# Patient Record
Sex: FEMALE | Race: WHITE | NOT HISPANIC OR LATINO | URBAN - METROPOLITAN AREA
[De-identification: names, ages, dates, MRNs, and addresses within clinical notes are randomized per-mention and may not be internally consistent; named-entity substitution may affect disease eponyms.]

---

## 2021-11-17 ENCOUNTER — EMERGENCY (EMERGENCY)
Facility: HOSPITAL | Age: 36
LOS: 1 days | Discharge: HOME | End: 2021-11-17
Attending: EMERGENCY MEDICINE | Admitting: EMERGENCY MEDICINE
Payer: COMMERCIAL

## 2021-11-17 VITALS
DIASTOLIC BLOOD PRESSURE: 91 MMHG | RESPIRATION RATE: 18 BRPM | OXYGEN SATURATION: 98 % | HEART RATE: 97 BPM | SYSTOLIC BLOOD PRESSURE: 135 MMHG | TEMPERATURE: 99 F

## 2021-11-17 PROCEDURE — 99284 EMERGENCY DEPT VISIT MOD MDM: CPT

## 2021-11-17 NOTE — ED ADULT TRIAGE NOTE - CHIEF COMPLAINT QUOTE
Pt states she is 8 weeks pregnant, tested +Covid. Pt states she is being sent in by her OBGYN to get the monoclonal antibodies. c/o fever/cough and stuffy nose.

## 2021-11-18 VITALS
DIASTOLIC BLOOD PRESSURE: 84 MMHG | RESPIRATION RATE: 16 BRPM | SYSTOLIC BLOOD PRESSURE: 117 MMHG | TEMPERATURE: 98 F | HEART RATE: 83 BPM | OXYGEN SATURATION: 99 %

## 2021-11-18 LAB
APPEARANCE UR: ABNORMAL
BACTERIA # UR AUTO: ABNORMAL
BILIRUB UR-MCNC: NEGATIVE — SIGNIFICANT CHANGE UP
COLOR SPEC: YELLOW — SIGNIFICANT CHANGE UP
DIFF PNL FLD: ABNORMAL
EPI CELLS # UR: 19 /HPF — HIGH (ref 0–5)
GLUCOSE UR QL: NEGATIVE — SIGNIFICANT CHANGE UP
HYALINE CASTS # UR AUTO: 0 /LPF — SIGNIFICANT CHANGE UP (ref 0–7)
KETONES UR-MCNC: NEGATIVE — SIGNIFICANT CHANGE UP
LEUKOCYTE ESTERASE UR-ACNC: ABNORMAL
NITRITE UR-MCNC: NEGATIVE — SIGNIFICANT CHANGE UP
PH UR: 5.5 — SIGNIFICANT CHANGE UP (ref 5–8)
PROT UR-MCNC: ABNORMAL
RBC CASTS # UR COMP ASSIST: 2 /HPF — SIGNIFICANT CHANGE UP (ref 0–4)
SP GR SPEC: 1.03 — SIGNIFICANT CHANGE UP (ref 1–1.05)
UROBILINOGEN FLD QL: SIGNIFICANT CHANGE UP
WBC UR QL: 3 /HPF — SIGNIFICANT CHANGE UP (ref 0–5)

## 2021-11-18 NOTE — ED ADULT NURSE NOTE - OBJECTIVE STATEMENT
35 year old female presents A&Ox4 8 week pregnant, coming into ED after testing positive for COVID-19 today. Pt states OBGYN sent her in for monoclonal antibodies. Pt endorsing current yeast infection, vaginal discomfort and stuffy nose at this time but denies any other symptoms. States she had a fever earlier but fever broke after taking tylenol at home. Respirations even and unlabored, speaking in full sentences without any difficulty, no accessory muscle use, sating 99% on room air, skin intact. Pt denies any chest pain, dyspnea, dizziness, blurry vision, nausea, vomiting, diarrhea, recent falls or LOC. Pt well appearing and in no acute distress at this time. Pt awaiting urine results. Bed in lowest position, safety maintained, call bell within reach.

## 2021-11-18 NOTE — ED PROVIDER NOTE - PROGRESS NOTE DETAILS
Eder Flores, PGY3: UA contaminated (19 epithelials). Will send cx but will likely result as >3 organisms. Patient currently without dysuria. Will advise to follow up with PMD or OB for repeat. Will give CARES for infusion. Discussed return precautions and all questions answered. Pt in agreement w/ plan. CAOx3, NAD, VSS. Stable for d/c.

## 2021-11-18 NOTE — ED PROVIDER NOTE - NSFOLLOWUPINSTRUCTIONS_ED_ALL_ED_FT
Please call our [facility] Ambulatory Resource Support (CARES) program at [phone].    Rest, drink plenty of fluids.  Advance activity as tolerated.  Continue all previously prescribed medications as directed.  Follow up with your primary care physician in 48-72 hours- bring copies of your results.  Return to the ER for worsening or persistent symptoms, and/or ANY NEW OR CONCERNING SYMPTOMS. If you have issues obtaining follow up, please call: [phone] to obtain a doctor or specialist who takes your insurance in your area.

## 2021-11-18 NOTE — ED PROVIDER NOTE - OBJECTIVE STATEMENT
35 year old female no PMH  currently 8 weeks pregnant presents to ED for monoclonal antibodies. Patient states fever, chills, and URI sx since . She tested COVID+ today. She was referred to the ED by her OBGYN for monoclonal antibody infusion. She denies dysuria but states she is currently on monostat for yeast infection. She denies sob, cp, abd pain, vaginal discharge, n/v/d.

## 2021-11-18 NOTE — ED PROVIDER NOTE - NS ED ROS FT
GENERAL: +fever, +chills  EYES: no change in vision, no irritation, no discharge, no redness, no pain  HEENT: no trouble swallowing or speaking, no throat pain, no ear pain, +congestion  CARDIAC: no chest pain, no palpitations   PULMONARY: +cough, no shortness of breath, no wheezing  GI: no abdominal pain, no nausea, no vomiting, no diarrhea, no constipation  : no changes in urination, no dysuria  SKIN: no rashes  NEURO: no headache, no numbness, no weakness  MSK: no joint pain, no muscle pain, no back pain, no calf pain

## 2021-11-18 NOTE — ED PROVIDER NOTE - PATIENT PORTAL LINK FT
You can access the FollowMyHealth Patient Portal offered by [facility] by registering at the following website: [email or web address]. By joining [facility]’s FollowMyHealth portal, you will also be able to view your health information using other applications (apps) compatible with our system.

## 2021-11-18 NOTE — ED PROVIDER NOTE - ATTENDING CONTRIBUTION TO CARE
Seen and examined, states early preg., had N/V 2wks ago, improved now, sophia po well, dx covid and c/o fever and HA, mild cough, no SOB/[place]FF[place], pulse ox 100% RA, told by OB to go to ED for monoclonal Ab tx but unable to provide in ED tonight. Denies other c/o, no CP/back pain, no vag bleeding or discharge, has close OB F/U. MMM, clear lungs, heart reg, abd soft, NT to palp.

## 2021-11-18 NOTE — ED ADULT NURSE NOTE - HAVE YOU HAD A FIRST COVID-19 BOOSTER?
Patient accepted and admission order received from:: CLAUDIA CARTER [[id number]]   Patient Class: Inpatient [1]   Patient on Telemetry: Yes   Transferring Patient to? Only adjust for transfers between [facility] ([facility], [facility], [facility]: [facility] [[id number]]  
No

## 2021-11-18 NOTE — ED PROVIDER NOTE - NSPTACCESSSVCSAPPTDETAILS_ED_ALL_ED_FT
URGENT: Please register for CARES. Patient is 8 weeks pregnant and COVID+ and requesting monoclonal antibody infusion.

## 2021-11-18 NOTE — ED PROVIDER NOTE - CLINICAL SUMMARY MEDICAL DECISION MAKING FREE TEXT BOX
Eder Flores, PGY3: 35 year old female COVID+ presents for monoclonal ab infusion. Currently 8 weeks pregnant. Will obtain UA r/o bacteruria, unable to do infusion due to nursing restrictions. Will give CARES follow up.

## 2021-11-19 LAB
CULTURE RESULTS: SIGNIFICANT CHANGE UP
SPECIMEN SOURCE: SIGNIFICANT CHANGE UP

## 2021-11-21 NOTE — ED POST DISCHARGE NOTE - RESULT SUMMARY
UCX: Streptococcus agalactiae Gp B 50,000-99,000CFU/ml . No antibiotic listed in ED provider note or prescription writer at time of discharge. Patient pregnant. message left with Call Back  P.A. number and hours for return call back.

## 2022-01-28 ENCOUNTER — TRANSCRIPTION ENCOUNTER (OUTPATIENT)
Age: 37
End: 2022-01-28

## 2022-01-28 ENCOUNTER — OUTPATIENT (OUTPATIENT)
Dept: INPATIENT UNIT | Facility: HOSPITAL | Age: 37
LOS: 1 days | Discharge: HOME | End: 2022-01-28

## 2022-01-28 VITALS
HEART RATE: 89 BPM | TEMPERATURE: 98 F | SYSTOLIC BLOOD PRESSURE: 120 MMHG | DIASTOLIC BLOOD PRESSURE: 63 MMHG | RESPIRATION RATE: 16 BRPM

## 2022-01-28 VITALS — SYSTOLIC BLOOD PRESSURE: 109 MMHG | DIASTOLIC BLOOD PRESSURE: 66 MMHG | HEART RATE: 74 BPM

## 2022-01-28 DIAGNOSIS — Z3A.00 WEEKS OF GESTATION OF PREGNANCY NOT SPECIFIED: ICD-10-CM

## 2022-01-28 DIAGNOSIS — O26.899 OTHER SPECIFIED PREGNANCY RELATED CONDITIONS, UNSPECIFIED TRIMESTER: ICD-10-CM

## 2022-01-28 PROBLEM — Z00.00 ENCOUNTER FOR PREVENTIVE HEALTH EXAMINATION: Status: ACTIVE | Noted: 2022-01-28

## 2022-01-28 LAB
APPEARANCE UR: CLEAR — SIGNIFICANT CHANGE UP
BACTERIA # UR AUTO: ABNORMAL
BILIRUB UR-MCNC: NEGATIVE — SIGNIFICANT CHANGE UP
COLOR SPEC: YELLOW — SIGNIFICANT CHANGE UP
DIFF PNL FLD: ABNORMAL
EPI CELLS # UR: 3 /HPF — SIGNIFICANT CHANGE UP (ref 0–5)
GLUCOSE UR QL: NEGATIVE — SIGNIFICANT CHANGE UP
HYALINE CASTS # UR AUTO: 0 /LPF — SIGNIFICANT CHANGE UP (ref 0–7)
KETONES UR-MCNC: NEGATIVE — SIGNIFICANT CHANGE UP
LEUKOCYTE ESTERASE UR-ACNC: ABNORMAL
NITRITE UR-MCNC: NEGATIVE — SIGNIFICANT CHANGE UP
PH UR: 6 — SIGNIFICANT CHANGE UP (ref 5–8)
PROT UR-MCNC: ABNORMAL
RBC CASTS # UR COMP ASSIST: 2 /HPF — SIGNIFICANT CHANGE UP (ref 0–4)
SP GR SPEC: 1.02 — SIGNIFICANT CHANGE UP (ref 1–1.05)
UROBILINOGEN FLD QL: SIGNIFICANT CHANGE UP
WBC UR QL: 6 /HPF — HIGH (ref 0–5)

## 2022-01-28 PROCEDURE — 99213 OFFICE O/P EST LOW 20 MIN: CPT

## 2022-01-28 NOTE — OB PROVIDER TRIAGE NOTE - NSHPPHYSICALEXAM_GEN_ALL_CORE
Vital Signs Last 24 Hrs  T(C): 36.9 (28 Jan 2022 12:44), Max: 36.9 (28 Jan 2022 11:51)  T(F): 98.42 (28 Jan 2022 12:44), Max: 98.42 (28 Jan 2022 12:44)  HR: 71 (28 Jan 2022 14:57) (71 - 166)  BP: 107/62 (28 Jan 2022 14:57) (107/62 - 126/67)  RR: 16 (28 Jan 2022 11:51) (16 - 16)    Abdomen gravid, soft and nontender  Neg cva/suprapubic tenderness  Casey monitoring  TAS - + FH 148bpm          - breech/post plac/mvp 4.66  SSE - dark brown discharge.  No active process noted.  Vaginal mucosa appears irritated/friable.  Cervix appears closed on inspection.  TVUS - Cervical length 4.51 -4.83cm with no dynamic changes.  cervix appears closed on inspection.    SVE - deferred  Orders:  UA  Patient is scheduled  for an anatomy sono on 2/7/2022.

## 2022-01-28 NOTE — OB RN TRIAGE NOTE - FALL HARM RISK - UNIVERSAL INTERVENTIONS
Bed in lowest position, wheels locked, appropriate side rails in place/Call bell, personal items and telephone in reach/Instruct patient to call for assistance before getting out of bed or chair/Non-slip footwear when patient is out of bed/[place] to call system/Physically safe environment - no spills, clutter or unnecessary equipment/Purposeful Proactive Rounding/Room/bathroom lighting operational, light cord in reach

## 2022-01-28 NOTE — OB PROVIDER TRIAGE NOTE - HISTORY OF PRESENT ILLNESS
PNC Provider:  Dr Campuzano  EDC:  6/26/2022  Patient is a 37y/o G 1 P 0000 @ 18 6/7wks gest with c/o spotting; brownic discharge after 0900.  Denies  fever, chills, loss of fluids, contractions, dysuria.     Reports that she is currently having a yeast infections.    Denies prior covid-19 infection;    vaccinated - pfizer  AP Course: recurrent yeast infections.  States that she was prescribed Terazone last wk. but has not taken it.  Meds: pnv, probiotic, folic acid, vit c & asa 81mg daily  NKDA    PMHx/PSHx/GYN/SH/Psych denies  OBHx - present

## 2022-01-28 NOTE — OB PROVIDER TRIAGE NOTE - NSOBPROVIDERNOTE_OBGYN_ALL_OB_FT
Discussed findings with  Discussed findings with Dr. Bergman  Plan:  patient is cleared for discharge home and instructed to use terconazole as prescribed.  Encouraged to eat unsweetened yogurt.  F/U with pnc provider as scheduled

## 2022-01-28 NOTE — OB PROVIDER TRIAGE NOTE - NSHPLABSRESULTS_GEN_ALL_CORE
UA UA                  Urinalysis Basic - ( 2022 15:23 )    Color: Yellow / Appearance: Clear / S.022 / pH: x  Gluc: x / Ketone: Negative  / Bili: Negative / Urobili: <2 mg/dL   Blood: x / Protein: Trace / Nitrite: Negative   Leuk Esterase: Large / RBC: 2 /HPF / WBC 6 /HPF   Sq Epi: x / Non Sq Epi: 3 /HPF / Bacteria: Few

## 2022-01-28 NOTE — OB PROVIDER TRIAGE NOTE - ADDITIONAL INSTRUCTIONS
Plan:  patient is cleared for discharge home and instructed to use terconazole as prescribed.  Encouraged to eat unsweetened yogurt.  F/U with pnc provider as scheduled

## 2022-02-07 ENCOUNTER — ASOB RESULT (OUTPATIENT)
Age: 37
End: 2022-02-07

## 2022-02-07 ENCOUNTER — APPOINTMENT (OUTPATIENT)
Dept: ANTEPARTUM | Facility: CLINIC | Age: 37
End: 2022-02-07
Payer: COMMERCIAL

## 2022-02-07 PROCEDURE — 76805 OB US >/= 14 WKS SNGL FETUS: CPT

## 2022-04-06 ENCOUNTER — APPOINTMENT (OUTPATIENT)
Dept: ANTEPARTUM | Facility: CLINIC | Age: 37
End: 2022-04-06

## 2022-04-12 ENCOUNTER — ASOB RESULT (OUTPATIENT)
Age: 37
End: 2022-04-12

## 2022-04-12 ENCOUNTER — APPOINTMENT (OUTPATIENT)
Dept: ANTEPARTUM | Facility: CLINIC | Age: 37
End: 2022-04-12
Payer: COMMERCIAL

## 2022-04-12 PROCEDURE — 76819 FETAL BIOPHYS PROFIL W/O NST: CPT

## 2022-04-12 PROCEDURE — 76816 OB US FOLLOW-UP PER FETUS: CPT

## 2022-05-27 ENCOUNTER — APPOINTMENT (OUTPATIENT)
Dept: ANTEPARTUM | Facility: CLINIC | Age: 37
End: 2022-05-27
Payer: COMMERCIAL

## 2022-05-27 ENCOUNTER — ASOB RESULT (OUTPATIENT)
Age: 37
End: 2022-05-27

## 2022-05-27 PROCEDURE — 76816 OB US FOLLOW-UP PER FETUS: CPT

## 2022-05-27 PROCEDURE — 76819 FETAL BIOPHYS PROFIL W/O NST: CPT

## 2022-06-19 ENCOUNTER — INPATIENT (INPATIENT)
Facility: HOSPITAL | Age: 37
LOS: 3 days | Discharge: HOME | End: 2022-06-23
Attending: OBSTETRICS & GYNECOLOGY | Admitting: OBSTETRICS & GYNECOLOGY

## 2022-06-19 ENCOUNTER — TRANSCRIPTION ENCOUNTER (OUTPATIENT)
Age: 37
End: 2022-06-19

## 2022-06-19 VITALS — TEMPERATURE: 98 F | DIASTOLIC BLOOD PRESSURE: 69 MMHG | SYSTOLIC BLOOD PRESSURE: 115 MMHG | HEART RATE: 79 BPM

## 2022-06-19 DIAGNOSIS — Z3A.00 WEEKS OF GESTATION OF PREGNANCY NOT SPECIFIED: ICD-10-CM

## 2022-06-19 DIAGNOSIS — O42.90 PREMATURE RUPTURE OF MEMBRANES, UNSPECIFIED AS TO LENGTH OF TIME BETWEEN RUPTURE AND ONSET OF LABOR, UNSPECIFIED WEEKS OF GESTATION: ICD-10-CM

## 2022-06-19 DIAGNOSIS — O26.899 OTHER SPECIFIED PREGNANCY RELATED CONDITIONS, UNSPECIFIED TRIMESTER: ICD-10-CM

## 2022-06-19 LAB
AMNISURE ROM (RUPTURE OF MEMBRANES): NEGATIVE — SIGNIFICANT CHANGE UP
BASOPHILS # BLD AUTO: 0.04 K/UL — SIGNIFICANT CHANGE UP (ref 0–0.2)
BASOPHILS NFR BLD AUTO: 0.5 % — SIGNIFICANT CHANGE UP (ref 0–2)
BLD GP AB SCN SERPL QL: NEGATIVE — SIGNIFICANT CHANGE UP
COVID-19 SPIKE DOMAIN AB INTERP: POSITIVE
COVID-19 SPIKE DOMAIN ANTIBODY RESULT: >250 U/ML — HIGH
EOSINOPHIL # BLD AUTO: 0.1 K/UL — SIGNIFICANT CHANGE UP (ref 0–0.5)
EOSINOPHIL NFR BLD AUTO: 1.1 % — SIGNIFICANT CHANGE UP (ref 0–6)
HBV SURFACE AG SERPL QL IA: SIGNIFICANT CHANGE UP
HCT VFR BLD CALC: 37.3 % — SIGNIFICANT CHANGE UP (ref 34.5–45)
HGB BLD-MCNC: 12.1 G/DL — SIGNIFICANT CHANGE UP (ref 11.5–15.5)
HIV 1+2 AB+HIV1 P24 AG SERPL QL IA: SIGNIFICANT CHANGE UP
IANC: 6.77 K/UL — SIGNIFICANT CHANGE UP (ref 1.8–7.4)
IMM GRANULOCYTES NFR BLD AUTO: 1.1 % — SIGNIFICANT CHANGE UP (ref 0–1.5)
LYMPHOCYTES # BLD AUTO: 0.91 K/UL — LOW (ref 1–3.3)
LYMPHOCYTES # BLD AUTO: 10.3 % — LOW (ref 13–44)
MCHC RBC-ENTMCNC: 30.6 PG — SIGNIFICANT CHANGE UP (ref 27–34)
MCHC RBC-ENTMCNC: 32.4 GM/DL — SIGNIFICANT CHANGE UP (ref 32–36)
MCV RBC AUTO: 94.2 FL — SIGNIFICANT CHANGE UP (ref 80–100)
MONOCYTES # BLD AUTO: 0.92 K/UL — HIGH (ref 0–0.9)
MONOCYTES NFR BLD AUTO: 10.4 % — SIGNIFICANT CHANGE UP (ref 2–14)
NEUTROPHILS # BLD AUTO: 6.77 K/UL — SIGNIFICANT CHANGE UP (ref 1.8–7.4)
NEUTROPHILS NFR BLD AUTO: 76.6 % — SIGNIFICANT CHANGE UP (ref 43–77)
NRBC # BLD: 0 /100 WBCS — SIGNIFICANT CHANGE UP
NRBC # FLD: 0 K/UL — SIGNIFICANT CHANGE UP
PLATELET # BLD AUTO: 275 K/UL — SIGNIFICANT CHANGE UP (ref 150–400)
RBC # BLD: 3.96 M/UL — SIGNIFICANT CHANGE UP (ref 3.8–5.2)
RBC # FLD: 13.7 % — SIGNIFICANT CHANGE UP (ref 10.3–14.5)
RH IG SCN BLD-IMP: POSITIVE — SIGNIFICANT CHANGE UP
RH IG SCN BLD-IMP: POSITIVE — SIGNIFICANT CHANGE UP
SARS-COV-2 IGG+IGM SERPL QL IA: >250 U/ML — HIGH
SARS-COV-2 IGG+IGM SERPL QL IA: POSITIVE
SARS-COV-2 RNA SPEC QL NAA+PROBE: SIGNIFICANT CHANGE UP
WBC # BLD: 8.84 K/UL — SIGNIFICANT CHANGE UP (ref 3.8–10.5)
WBC # FLD AUTO: 8.84 K/UL — SIGNIFICANT CHANGE UP (ref 3.8–10.5)

## 2022-06-19 RX ORDER — SODIUM CHLORIDE 9 MG/ML
1000 INJECTION, SOLUTION INTRAVENOUS
Refills: 0 | Status: DISCONTINUED | OUTPATIENT
Start: 2022-06-19 | End: 2022-06-20

## 2022-06-19 RX ORDER — AMPICILLIN TRIHYDRATE 250 MG
1 CAPSULE ORAL EVERY 4 HOURS
Refills: 0 | Status: DISCONTINUED | OUTPATIENT
Start: 2022-06-19 | End: 2022-06-20

## 2022-06-19 RX ORDER — INFLUENZA VIRUS VACCINE 15; 15; 15; 15 UG/.5ML; UG/.5ML; UG/.5ML; UG/.5ML
0.5 SUSPENSION INTRAMUSCULAR ONCE
Refills: 0 | Status: DISCONTINUED | OUTPATIENT
Start: 2022-06-19 | End: 2022-06-21

## 2022-06-19 RX ORDER — OXYTOCIN 10 UNIT/ML
2 VIAL (ML) INJECTION
Qty: 30 | Refills: 0 | Status: DISCONTINUED | OUTPATIENT
Start: 2022-06-19 | End: 2022-06-20

## 2022-06-19 RX ORDER — OXYTOCIN 10 UNIT/ML
333.33 VIAL (ML) INJECTION
Qty: 20 | Refills: 0 | Status: DISCONTINUED | OUTPATIENT
Start: 2022-06-19 | End: 2022-06-20

## 2022-06-19 RX ORDER — AMPICILLIN TRIHYDRATE 250 MG
2 CAPSULE ORAL ONCE
Refills: 0 | Status: COMPLETED | OUTPATIENT
Start: 2022-06-19 | End: 2022-06-19

## 2022-06-19 RX ADMIN — Medication 108 GRAM(S): at 20:10

## 2022-06-19 RX ADMIN — Medication 216 GRAM(S): at 12:10

## 2022-06-19 RX ADMIN — Medication 2 MILLIUNIT(S)/MIN: at 14:32

## 2022-06-19 RX ADMIN — Medication 108 GRAM(S): at 16:10

## 2022-06-19 RX ADMIN — Medication 0.25 MILLIGRAM(S): at 20:14

## 2022-06-19 NOTE — OB PROVIDER LABOR PROGRESS NOTE - ASSESSMENT
Plan   s/p terb, pitocin paused   will allow tracing to recover, restart pitocin if sustained cat 1 tracing is achieved   Cont EFM/[place], ISE placed   anticipate      Zeinab Marquez MD PGY3  Dr. Wolff aware and in house, Carlos PGY3 at bedside 
Pt tolerated exam well. Cervix soft.    - discontinue PO cytotec.  - switch to pitocin    Amyeo Afroz Jereen, PGY-1  d/w Dr Wolff
Plan   cont pitocin, currently at 4mU   cont intrauterine resuscitation PRN, currently receiving 500cc bolus, repositioning PRN   Will uptitrate pitocin as able when tracing is Cat 1  Cont EFM/[place]  Anticipate      Zeinab Marquez MD PGY3  d/w Dr. Wolff

## 2022-06-19 NOTE — OB RN PATIENT PROFILE - NS_OBGYNHISTORY_OBGYN_ALL_OB_FT
AP course uncomplicated   OB: recurrent yeast infections until third trimester   GYN: Denies     GBS unknown

## 2022-06-19 NOTE — OB PROVIDER H&P - PROBLEM SELECTOR PLAN 1
Plan d/w Dr. Bergman   Patient admitted for IOL for PROM.   For PO cytotec.   pain management prn   routine orders   covid pcr   support vaccinated   consents signed by patient

## 2022-06-19 NOTE — OB PROVIDER TRIAGE NOTE - NSHPPHYSICALEXAM_GEN_ALL_CORE
Vital Signs Last 24 Hrs  T(C): 36.6 (19 Jun 2022 03:53), Max: 36.6 (19 Jun 2022 03:49)  T(F): 97.9 (19 Jun 2022 03:53), Max: 97.9 (19 Jun 2022 03:53)  HR: 79 (19 Jun 2022 03:53) (79 - 79)  BP: 115/69 (19 Jun 2022 03:53) (115/69 - 115/69)  BP(mean): --  RR: 17 (19 Jun 2022 03:53) (17 - 17)  SpO2: --    Assessment reveals VSS  Abdomen soft, NT, gravid  Cat 1 tracing, irregular contractions on toco Q2-4 min  Transabdominal Ultrasound- BPP 8/8, EYAL 7.94, cephalic presentation, posterior placenta   Sterile Speculum-positive pooling bloody fluid noted in vaginal vault, nitrazine positive, FERN negative.   Vaginal Exam- 1/50/-3   A&Ox3  Lungs- clear bilateral  Heart- normal rate and rhythm

## 2022-06-19 NOTE — OB PROVIDER TRIAGE NOTE - HISTORY OF PRESENT ILLNESS
36 year old  at 39 weeks presents with c/o leaking of fluid since 0120, states the fluid was clear but now is bloody. Reports feeling contractions pain 2/10 does not desire pain medication at this time. States good fetal movement. Denies any ob complications.   PMH: Covid 2021  PSH: Denies   Allergies: Denies   Denies history of anxiety, depression  Denies use of etoh, drugs, tobacco during pregnancy

## 2022-06-19 NOTE — OB RN TRIAGE NOTE - CHIEF COMPLAINT QUOTE
I broke my water at 1.20 am  today clear fluid and I have bleeding". "I broke my water at 1.20 am  today clear fluid and I have bleeding".

## 2022-06-19 NOTE — OB RN PATIENT PROFILE - FALL HARM RISK - UNIVERSAL INTERVENTIONS
Bed in lowest position, wheels locked, appropriate side rails in place/Call bell, personal items and telephone in reach/Instruct patient to call for assistance before getting out of bed or chair/Non-slip footwear when patient is out of bed/[place] to call system/Physically safe environment - no spills, clutter or unnecessary equipment/Purposeful Proactive Rounding/Room/bathroom lighting operational, light cord in reach

## 2022-06-19 NOTE — OB PROVIDER H&P - NS_OBGYNHISTORY_OBGYN_ALL_OB_FT
AP course uncomplicated   OB: recurrent yeast infections until third trimester   GYN: Denies     GBS negative as per patient AP course uncomplicated   OB: recurrent yeast infections until third trimester   GYN: Denies     GBS unknown

## 2022-06-19 NOTE — OB PROVIDER LABOR PROGRESS NOTE - NS_SUBJECTIVE/OBJECTIVE_OBGYN_ALL_OB_FT
SVE 4/80/-2  FHT: Cat 2, 135/mod/+accels/rare variables, reassuring   [place]: q1-3min    Vital Signs Last 24 Hrs  T(C): 36.8 (19 Jun 2022 14:39), Max: 36.9 (19 Jun 2022 10:28)  T(F): 98.24 (19 Jun 2022 14:39), Max: 98.42 (19 Jun 2022 10:28)  HR: 73 (19 Jun 2022 16:17) (68 - 91)  BP: 112/73 (19 Jun 2022 16:17) (82/50 - 132/67)  RR: 18 (19 Jun 2022 06:05) (17 - 18)  SpO2: 98% (19 Jun 2022 16:14) (93% - 100%)      Nicole Cee MD  OBGYN PGY3
R2 Labor Note     Patient examined for labor progress. Having intermittent late/variable/early decels
Pt due for assessment
R3 Labor Note     Patient examined for prolonged deceleration. Decel to 80s x6min with tetanic contraction. Terb x1 given and HR recovered with positioning changes. Cervix noted to be more edematous

## 2022-06-19 NOTE — OB PROVIDER TRIAGE NOTE - NS_OBGYNHISTORY_OBGYN_ALL_OB_FT
AP course uncomplicated   OB: Denies   GYN: Denies     GBS negative as per patient AP course uncomplicated   OB: recurrent yeast infections until third trimester   GYN: Denies     GBS negative as per patient AP course uncomplicated   OB: recurrent yeast infections until third trimester   GYN: Denies     GBS unknown

## 2022-06-19 NOTE — OB PROVIDER TRIAGE NOTE - NSOBPROVIDERNOTE_OBGYN_ALL_OB_FT
NST in progress  Amniosure sent- results pend.   will continue to monitor NST in progress  Amniosure sent- results pend.   will continue to monitor    Re-checked slide for FERNING, positive ferns noted on slide.   Amnisure negative.   Dr. Bergman made aware   patient to be admitted for IOL. NST in progress  Amniosure sent- results pend.   will continue to monitor    0500:Re-checked slide for FERNING, positive ferns noted on slide at this time   Amnisure negative.   Dr. Bergman made aware   patient to be admitted for IOL.

## 2022-06-19 NOTE — OB PROVIDER H&P - HISTORY OF PRESENT ILLNESS
36 year old  at 39 weeks presents with c/o leaking of fluid since 0120, states the fluid was clear but now is bloody. Reports feeling contractions pain 2/10 does not desire pain medication at this time. Denies recent intercourse. States good fetal movement. Denies any ob complications.   PMH: Covid 2021  PSH: Denies   Allergies: Denies   Denies history of anxiety, depression  Denies use of etoh, drugs, tobacco during pregnancy

## 2022-06-19 NOTE — OB PROVIDER LABOR PROGRESS NOTE - NS_OBIHIFHRDETAILS_OBGYN_ALL_OB_FT
s/p prolonged decel with return to baseline 150, mod annika
130, mod annika, intermittent late/early/variable decels
130/mod/+accels no decels

## 2022-06-20 ENCOUNTER — RESULT REVIEW (OUTPATIENT)
Age: 37
End: 2022-06-20

## 2022-06-20 LAB
RUBV IGG SER-ACNC: 1.1 INDEX — SIGNIFICANT CHANGE UP
RUBV IGG SER-IMP: POSITIVE — SIGNIFICANT CHANGE UP
T PALLIDUM AB TITR SER: NEGATIVE — SIGNIFICANT CHANGE UP

## 2022-06-20 PROCEDURE — 88307 TISSUE EXAM BY PATHOLOGIST: CPT | Mod: 26

## 2022-06-20 RX ORDER — IBUPROFEN 200 MG
600 TABLET ORAL EVERY 6 HOURS
Refills: 0 | Status: COMPLETED | OUTPATIENT
Start: 2022-06-20 | End: 2023-05-19

## 2022-06-20 RX ORDER — FERROUS SULFATE 325(65) MG
325 TABLET ORAL DAILY
Refills: 0 | Status: DISCONTINUED | OUTPATIENT
Start: 2022-06-20 | End: 2022-06-23

## 2022-06-20 RX ORDER — OXYCODONE HYDROCHLORIDE 5 MG/1
5 TABLET ORAL ONCE
Refills: 0 | Status: DISCONTINUED | OUTPATIENT
Start: 2022-06-20 | End: 2022-06-23

## 2022-06-20 RX ORDER — SODIUM CHLORIDE 9 MG/ML
1000 INJECTION, SOLUTION INTRAVENOUS
Refills: 0 | Status: DISCONTINUED | OUTPATIENT
Start: 2022-06-20 | End: 2022-06-20

## 2022-06-20 RX ORDER — OXYCODONE HYDROCHLORIDE 5 MG/1
5 TABLET ORAL
Refills: 0 | Status: DISCONTINUED | OUTPATIENT
Start: 2022-06-20 | End: 2022-06-21

## 2022-06-20 RX ORDER — OXYCODONE HYDROCHLORIDE 5 MG/1
5 TABLET ORAL
Refills: 0 | Status: COMPLETED | OUTPATIENT
Start: 2022-06-20 | End: 2022-06-27

## 2022-06-20 RX ORDER — MAGNESIUM HYDROXIDE 400 MG/1
30 TABLET, CHEWABLE ORAL
Refills: 0 | Status: DISCONTINUED | OUTPATIENT
Start: 2022-06-20 | End: 2022-06-23

## 2022-06-20 RX ORDER — DIPHENHYDRAMINE HCL 50 MG
25 CAPSULE ORAL EVERY 6 HOURS
Refills: 0 | Status: DISCONTINUED | OUTPATIENT
Start: 2022-06-20 | End: 2022-06-23

## 2022-06-20 RX ORDER — SIMETHICONE 80 MG/1
80 TABLET, CHEWABLE ORAL EVERY 4 HOURS
Refills: 0 | Status: DISCONTINUED | OUTPATIENT
Start: 2022-06-20 | End: 2022-06-23

## 2022-06-20 RX ORDER — ACETAMINOPHEN 500 MG
975 TABLET ORAL
Refills: 0 | Status: DISCONTINUED | OUTPATIENT
Start: 2022-06-20 | End: 2022-06-23

## 2022-06-20 RX ORDER — DEXAMETHASONE 0.5 MG/5ML
4 ELIXIR ORAL EVERY 6 HOURS
Refills: 0 | Status: DISCONTINUED | OUTPATIENT
Start: 2022-06-20 | End: 2022-06-21

## 2022-06-20 RX ORDER — KETOROLAC TROMETHAMINE 30 MG/ML
30 SYRINGE (ML) INJECTION EVERY 6 HOURS
Refills: 0 | Status: DISCONTINUED | OUTPATIENT
Start: 2022-06-20 | End: 2022-06-21

## 2022-06-20 RX ORDER — HEPARIN SODIUM 5000 [USP'U]/ML
5000 INJECTION INTRAVENOUS; SUBCUTANEOUS EVERY 12 HOURS
Refills: 0 | Status: DISCONTINUED | OUTPATIENT
Start: 2022-06-20 | End: 2022-06-23

## 2022-06-20 RX ORDER — OXYTOCIN 10 UNIT/ML
333.33 VIAL (ML) INJECTION
Qty: 20 | Refills: 0 | Status: DISCONTINUED | OUTPATIENT
Start: 2022-06-20 | End: 2022-06-20

## 2022-06-20 RX ORDER — NALBUPHINE HYDROCHLORIDE 10 MG/ML
2.5 INJECTION, SOLUTION INTRAMUSCULAR; INTRAVENOUS; SUBCUTANEOUS EVERY 6 HOURS
Refills: 0 | Status: DISCONTINUED | OUTPATIENT
Start: 2022-06-20 | End: 2022-06-21

## 2022-06-20 RX ORDER — FOLIC ACID 0.8 MG
1 TABLET ORAL DAILY
Refills: 0 | Status: DISCONTINUED | OUTPATIENT
Start: 2022-06-20 | End: 2022-06-23

## 2022-06-20 RX ORDER — TETANUS TOXOID, REDUCED DIPHTHERIA TOXOID AND ACELLULAR PERTUSSIS VACCINE, ADSORBED 5; 2.5; 8; 8; 2.5 [IU]/.5ML; [IU]/.5ML; UG/.5ML; UG/.5ML; UG/.5ML
0.5 SUSPENSION INTRAMUSCULAR ONCE
Refills: 0 | Status: DISCONTINUED | OUTPATIENT
Start: 2022-06-20 | End: 2022-06-23

## 2022-06-20 RX ORDER — ONDANSETRON 8 MG/1
4 TABLET, FILM COATED ORAL EVERY 6 HOURS
Refills: 0 | Status: DISCONTINUED | OUTPATIENT
Start: 2022-06-20 | End: 2022-06-21

## 2022-06-20 RX ORDER — NALOXONE HYDROCHLORIDE 4 MG/.1ML
0.1 SPRAY NASAL
Refills: 0 | Status: DISCONTINUED | OUTPATIENT
Start: 2022-06-20 | End: 2022-06-21

## 2022-06-20 RX ORDER — LANOLIN
1 OINTMENT (GRAM) TOPICAL EVERY 6 HOURS
Refills: 0 | Status: DISCONTINUED | OUTPATIENT
Start: 2022-06-20 | End: 2022-06-23

## 2022-06-20 RX ADMIN — Medication 975 MILLIGRAM(S): at 21:12

## 2022-06-20 RX ADMIN — Medication 325 MILLIGRAM(S): at 12:31

## 2022-06-20 RX ADMIN — Medication 108 GRAM(S): at 00:27

## 2022-06-20 RX ADMIN — Medication 1 TABLET(S): at 12:30

## 2022-06-20 RX ADMIN — HEPARIN SODIUM 5000 UNIT(S): 5000 INJECTION INTRAVENOUS; SUBCUTANEOUS at 12:30

## 2022-06-20 RX ADMIN — Medication 30 MILLIGRAM(S): at 17:57

## 2022-06-20 RX ADMIN — Medication 975 MILLIGRAM(S): at 14:49

## 2022-06-20 RX ADMIN — Medication 975 MILLIGRAM(S): at 20:12

## 2022-06-20 RX ADMIN — Medication 1 MILLIGRAM(S): at 12:02

## 2022-06-20 RX ADMIN — Medication 30 MILLIGRAM(S): at 12:01

## 2022-06-20 RX ADMIN — Medication 1000 MILLIUNIT(S)/MIN: at 05:53

## 2022-06-20 RX ADMIN — Medication 108 GRAM(S): at 04:24

## 2022-06-20 RX ADMIN — Medication 30 MILLIGRAM(S): at 12:28

## 2022-06-20 RX ADMIN — Medication 30 MILLIGRAM(S): at 18:20

## 2022-06-20 RX ADMIN — Medication 975 MILLIGRAM(S): at 15:32

## 2022-06-20 NOTE — OB PROVIDER DELIVERY SUMMARY - NSPROVIDERDELIVERYNOTE_OBGYN_ALL_OB_FT
Viable male infant, vertex OA position.   APGARs 9/9   Weight 3dgw80we   Grossly normal uterus, ovaries and bilateral fallopian tubes     QBL: 256     MSoloff PGY3 Viable male infant, vertex OA position.   APGARs 9/9   Weight 8dsx36gh   Grossly normal uterus, ovaries and bilateral fallopian tubes     QBL: 256     MSoloff PGY3    No intraop complications. Sponge, needle and instrument count correct x2. See full dictation MBeauvil

## 2022-06-20 NOTE — OB RN INTRAOPERATIVE NOTE - NSSELHIDDEN_OBGYN_ALL_OB_FT
[NS_DeliveryAttending1_OBGYN_ALL_OB_FT:[id number]==],[NS_DeliveryRN_OBGYN_ALL_OB_FT:[id number]]

## 2022-06-20 NOTE — OB PROVIDER DELIVERY SUMMARY - NSSELHIDDEN_OBGYN_ALL_OB_FT
[NS_DeliveryAttending1_OBGYN_ALL_OB_FT:[id number]==],[NS_DeliveryRN_OBGYN_ALL_OB_FT:[id number]]

## 2022-06-20 NOTE — OB RN DELIVERY SUMMARY - NSSELHIDDEN_OBGYN_ALL_OB_FT
[NS_DeliveryAttending1_OBGYN_ALL_OB_FT:[id number]==],[NS_DeliveryRN_OBGYN_ALL_OB_FT:[id number]]

## 2022-06-20 NOTE — OB RN DELIVERY SUMMARY - NS_SEPSISRSKCALC_OBGYN_ALL_OB_FT
Rupture of membranes must be entered above.   EOS calculated successfully. EOS Risk Factor: 0.5/1000 live births ([facility] national incidence); GA=39w1d; Temp=98.78; ROM=27.667; GBS='Unknown'; Antibiotics='Broad spectrum antibiotics > 4 hrs prior to birth'

## 2022-06-20 NOTE — OB NEONATOLOGY/PEDIATRICIAN DELIVERY SUMMARY - NSPEDSNEONOTESA_OBGYN_ALL_OB_FT
Pediatrician called to delivery for cat II FHT. Male infant born at 39+1wks via primary unscheduled  for cat II FHT to a 37 y/o  blood type A+ mother. No significant maternal or prenatal history. Prenatal labs HIV neg, HBsAg neg, Rubella and RPR pending, GBS + on urine, treated with ampicillin x4, COVID neg. SROM at 18:00 on  with clear fluids. Baby emerged vigorous, crying. Nuchal x1. Cord clamping delayed _sec. Infant was brought to radiant warmer and warmed, dried, stimulated and suctioned. HR>100, normal respiratory effort. APGARS of 9/9. Mom is initiating breast feeding. Consents to Hepatitis B vaccination. Desires for infant to be circumcised. EOS score 0.10, maternal highest temp 37.1C. Pediatrician is [facility]  Baby stable for transfer to  nursery. Parents updated. Pediatrician called to delivery for cat II FHT. Male infant born at 39+1wks via primary unscheduled  for cat II FHT to a 37 y/o  blood type A+ mother. No significant maternal or prenatal history. Prenatal labs HIV neg, HBsAg neg, Rubella and RPR pending, GBS + on urine, treated with ampicillin x4, COVID neg. SROM at 18:00 on  with clear fluids. Baby emerged vigorous, crying. Nuchal x1. Cord clamping delayed 30sec. Infant was brought to radiant warmer and warmed, dried, stimulated and suctioned. HR>100, normal respiratory effort. APGARS of 9/9. Mom is initiating breast feeding. Consents to Hepatitis B vaccination. Desires for infant to be circumcised. EOS score 0.10, maternal highest temp 37.1C. Pediatrician is [facility]  Baby stable for transfer to  nursery. Parents updated.

## 2022-06-20 NOTE — PACU DISCHARGE NOTE - HYDRATION STATUS:
"[facility] -   Cardiology  Consult Note    Patient Name: Brody Robins  MRN: [id number]  Admission Date: 10/27/2019  Hospital Length of Stay: 0 days  Code Status: No Order   Attending Provider: Ankit Mesa MD   Consulting Provider: ANDERS Jama  Primary Care Physician: Primary Doctor No  Principal Problem:Chest pain    Patient information was obtained from patient, spouse/SO, past medical records and ER records.     Inpatient consult to Cardiology  Consult performed by: ANDERS Moss  Consult ordered by: Ankit Mesa MD        Subjective:     Chief Complaint:  Chest pain     HPI:   Brody Robins is a 41 year old male who presented to [facility] due to chest pain with associated shortness of breath and vomiting. His current medical conditions include CAD s/p coronary stenting, HTN, ESRD on HD, PRES, DM Type II, Right BKA. ED workup revealed /101, H/H 7.3/22.1, Plts 140, Cr 10, BNP 2734, Troponin 0.099, CXR revealed mild coarsening bronchovascular markings/COPD. He was placed in observation under the care of hospital medicine. Cardiology consulted to assist with medical management. Chart reviewed, patient seen and examined. Patient endorses PCI in June or July in [place] and was started on ASA and Plavix. He underwent LHC 2 weeks ago in [place], [place] at [facility] which patient reports as "normal". He has been having issues with elevated BP for the past several years. Denies chest pain on exam today. No shortness of breath, PHAN or palpitations. Denies any missed HD treatments. He reports that he has been adjusting his HTN meds per primary cardiologist in [place] but his BP remains extremely elevated today on exam. ECHO pending. Medication adjustments made today. Agree with PRBC transfusion today.     History reviewed. No pertinent past medical history.    History reviewed. No pertinent surgical history.    Review of patient's allergies indicates:   Allergen Reactions    Morphine        No " current facility-administered medications on file prior to encounter.      No current outpatient medications on file prior to encounter.     Family History     None        Tobacco Use    Smoking status: Never Smoker    Smokeless tobacco: Never Used   Substance and Sexual Activity    Alcohol use: Never     Frequency: Never    Drug use: Never    Sexual activity: Not Currently     Review of Systems   Constitution: Positive for malaise/fatigue.   HENT: Negative for hearing loss and hoarse voice.    Eyes: Negative for blurred vision and visual disturbance.   Cardiovascular: Positive for chest pain (resolved) and dyspnea on exertion. Negative for claudication, irregular heartbeat, leg swelling, near-syncope, orthopnea, palpitations, paroxysmal nocturnal dyspnea and syncope.        S/P PCI in June/July   Respiratory: Positive for shortness of breath. Negative for cough, hemoptysis, sleep disturbances due to breathing, snoring and wheezing.    Endocrine: Negative for cold intolerance and heat intolerance.   Hematologic/Lymphatic: Bruises/bleeds easily.   Skin: Negative for color change, dry skin and nail changes.   Musculoskeletal: Positive for arthritis and back pain. Negative for joint pain and myalgias.        S/p Right BKA   Gastrointestinal: Negative for bloating, abdominal pain, constipation, nausea and vomiting.   Genitourinary: Negative for dysuria, flank pain, hematuria and hesitancy.        +ESRD on HD   Neurological: Negative for headaches, light-headedness, loss of balance, numbness, paresthesias and weakness.   Psychiatric/Behavioral: Negative for altered mental status.   Allergic/Immunologic: Negative for environmental allergies.     Objective:     Vital Signs (Most Recent):  Temp: 98.3 °F (36.8 °C) (10/28/19 0745)  Pulse: 95 (10/28/19 0831)  Resp: 16 (10/28/19 0745)  BP: (!) 223/105 (10/28/19 0831)  SpO2: 99 % (10/28/19 0745) Vital Signs (24h Range):  Temp:  [96.4 °F (35.8 °C)-99.3 °F (37.4 °C)] 98.3 °F  (36.8 °C)  Pulse:  [83-97] 95  Resp:  [] 16  SpO2:  [93 %-100 %] 99 %  BP: (174-223)/() 223/105     Weight: 99.1 kg (218 lb 7.6 oz)  Body mass index is 32.26 kg/m².    SpO2: 99 %  O2 Device (Oxygen Therapy): room air      Intake/Output Summary (Last 24 hours) at 10/28/2019 0955  Last data filed at 10/28/2019 0532  Gross per 24 hour   Intake 865 ml   Output --   Net 865 ml       Lines/Drains/Airways     Peripheral Intravenous Line                 Peripheral IV - Single Lumen 10/27/19 1102 22 G Right Wrist less than 1 day         Peripheral IV - Single Lumen 10/27/19 1212 20 G Right Forearm less than 1 day                Physical Exam   Constitutional: He is oriented to person, place, and time. He appears well-developed and well-nourished. No distress.   HENT:   Head: Normocephalic and atraumatic.   Eyes: Pupils are equal, round, and reactive to light.   Neck: Normal range of motion and full passive range of motion without pain. Neck supple. No JVD present.   Cardiovascular: Normal rate, regular rhythm, S1 normal, S2 normal and intact distal pulses. PMI is not displaced. Exam reveals no distant heart sounds.   No murmur heard.  Pulses:       Radial pulses are 2+ on the right side, and 2+ on the left side.        Dorsalis pedis pulses are 2+ on the left side.   Chest pain free on exam  BP extremely uncontrolled today   Pulmonary/Chest: Effort normal and breath sounds normal. No accessory muscle usage. No respiratory distress. He has no decreased breath sounds. He has no wheezes. He has no rales.   Abdominal: Soft. Bowel sounds are normal. He exhibits no distension. There is no tenderness.   Musculoskeletal: Normal range of motion. He exhibits no edema.        Left ankle: He exhibits no swelling.   Neurological: He is alert and oriented to person, place, and time.   Skin: Skin is warm and dry. He is not diaphoretic. No cyanosis. Nails show no clubbing.   Psychiatric: He has a normal mood and affect. His  "speech is normal and behavior is normal. Judgment and thought content normal. Cognition and memory are normal.   Nursing note and vitals reviewed.      Significant Labs:   BMP:   Recent Labs   Lab 10/27/19  1102   *      K 4.4   CL 97   CO2 24   BUN 51*   CREATININE 10.0*   CALCIUM 9.3   , CBC   Recent Labs   Lab 10/27/19  1102   WBC 5.47   HGB 7.3*   HCT 22.1*   *   , Troponin   Recent Labs   Lab 10/27/19  1102   TROPONINI 0.099*    and All pertinent lab results from the last 24 hours have been reviewed.    Significant Imaging: Echocardiogram: 2D echo with color flow doppler: No results found for this or any previous visit. and X-Ray: CXR: X-Ray Chest 1 View (CXR): No results found for this visit on 10/27/19.    Assessment and Plan:     * Chest pain  -Initial troponin 0.099, continue to trend serial troponin  -Repeat pending  -ECHO pending  -Reports recent PCI in July in [place]  -Recent LHC in [place] and reports "normal" per patient  -NO chest pain on exam today  -BP extremely uncontrolled, medications adjusted today    Coronary artery disease of native artery of native heart with stable angina pectoris  S/P PCI in June/July in [place]  Resume Plavix due to recent stenting  Continue ASA, Statin, Plavix, BB, ACEi, Norvasc, Hydralazine  Start Imdur daily  ECHO pending  Reassess in AM    Hypertensive urgency  -BP continues to be extremely uncontrolled  -Continue Norvasc, BB, Hydralazine  -Resume home Lisinopril today  -Needs aggressive BP management  -Troponin pending  -Monitor BP trend and adjust as needed.   -Will need follow up in clinic prior to travel back to [place] once able to be discharged which is not today from cardiology standpoint.     Diabetes mellitus, type II  -mgmt per primary team    ESRD (end stage renal disease)  -continue HD per nephrology        VTE Risk Mitigation (From admission, onward)         Ordered     heparin (porcine) injection 5,000 Units  Every 8 hours      " 10/27/19 2190                Thank you for your consult. I will follow-up with patient. Please contact us if you have any additional questions.    ANDERS Jama  Cardiology   [facility]     Satisfactory

## 2022-06-21 LAB
BASOPHILS # BLD AUTO: 0.05 K/UL — SIGNIFICANT CHANGE UP (ref 0–0.2)
BASOPHILS NFR BLD AUTO: 0.4 % — SIGNIFICANT CHANGE UP (ref 0–2)
EOSINOPHIL # BLD AUTO: 0.12 K/UL — SIGNIFICANT CHANGE UP (ref 0–0.5)
EOSINOPHIL NFR BLD AUTO: 0.9 % — SIGNIFICANT CHANGE UP (ref 0–6)
HCT VFR BLD CALC: 30.6 % — LOW (ref 34.5–45)
HGB BLD-MCNC: 9.8 G/DL — LOW (ref 11.5–15.5)
IANC: 10.63 K/UL — HIGH (ref 1.8–7.4)
IMM GRANULOCYTES NFR BLD AUTO: 0.5 % — SIGNIFICANT CHANGE UP (ref 0–1.5)
LYMPHOCYTES # BLD AUTO: 0.99 K/UL — LOW (ref 1–3.3)
LYMPHOCYTES # BLD AUTO: 7.5 % — LOW (ref 13–44)
MCHC RBC-ENTMCNC: 31.2 PG — SIGNIFICANT CHANGE UP (ref 27–34)
MCHC RBC-ENTMCNC: 32 GM/DL — SIGNIFICANT CHANGE UP (ref 32–36)
MCV RBC AUTO: 97.5 FL — SIGNIFICANT CHANGE UP (ref 80–100)
MONOCYTES # BLD AUTO: 1.27 K/UL — HIGH (ref 0–0.9)
MONOCYTES NFR BLD AUTO: 9.7 % — SIGNIFICANT CHANGE UP (ref 2–14)
NEUTROPHILS # BLD AUTO: 10.63 K/UL — HIGH (ref 1.8–7.4)
NEUTROPHILS NFR BLD AUTO: 81 % — HIGH (ref 43–77)
NRBC # BLD: 0 /100 WBCS — SIGNIFICANT CHANGE UP
NRBC # FLD: 0 K/UL — SIGNIFICANT CHANGE UP
PLATELET # BLD AUTO: 195 K/UL — SIGNIFICANT CHANGE UP (ref 150–400)
RBC # BLD: 3.14 M/UL — LOW (ref 3.8–5.2)
RBC # FLD: 13.7 % — SIGNIFICANT CHANGE UP (ref 10.3–14.5)
WBC # BLD: 13.13 K/UL — HIGH (ref 3.8–10.5)
WBC # FLD AUTO: 13.13 K/UL — HIGH (ref 3.8–10.5)

## 2022-06-21 RX ORDER — OXYCODONE HYDROCHLORIDE 5 MG/1
5 TABLET ORAL
Refills: 0 | Status: DISCONTINUED | OUTPATIENT
Start: 2022-06-21 | End: 2022-06-23

## 2022-06-21 RX ORDER — IBUPROFEN 200 MG
600 TABLET ORAL EVERY 6 HOURS
Refills: 0 | Status: DISCONTINUED | OUTPATIENT
Start: 2022-06-21 | End: 2022-06-23

## 2022-06-21 RX ADMIN — Medication 325 MILLIGRAM(S): at 11:35

## 2022-06-21 RX ADMIN — Medication 30 MILLIGRAM(S): at 06:01

## 2022-06-21 RX ADMIN — OXYCODONE HYDROCHLORIDE 5 MILLIGRAM(S): 5 TABLET ORAL at 21:18

## 2022-06-21 RX ADMIN — Medication 30 MILLIGRAM(S): at 00:54

## 2022-06-21 RX ADMIN — MAGNESIUM HYDROXIDE 30 MILLILITER(S): 400 TABLET, CHEWABLE ORAL at 17:21

## 2022-06-21 RX ADMIN — Medication 600 MILLIGRAM(S): at 11:35

## 2022-06-21 RX ADMIN — Medication 975 MILLIGRAM(S): at 15:45

## 2022-06-21 RX ADMIN — Medication 600 MILLIGRAM(S): at 18:01

## 2022-06-21 RX ADMIN — Medication 600 MILLIGRAM(S): at 12:24

## 2022-06-21 RX ADMIN — Medication 1 MILLIGRAM(S): at 11:35

## 2022-06-21 RX ADMIN — OXYCODONE HYDROCHLORIDE 5 MILLIGRAM(S): 5 TABLET ORAL at 20:42

## 2022-06-21 RX ADMIN — Medication 600 MILLIGRAM(S): at 23:57

## 2022-06-21 RX ADMIN — Medication 975 MILLIGRAM(S): at 08:54

## 2022-06-21 RX ADMIN — Medication 975 MILLIGRAM(S): at 14:51

## 2022-06-21 RX ADMIN — Medication 1 TABLET(S): at 11:35

## 2022-06-21 RX ADMIN — HEPARIN SODIUM 5000 UNIT(S): 5000 INJECTION INTRAVENOUS; SUBCUTANEOUS at 23:31

## 2022-06-21 RX ADMIN — Medication 600 MILLIGRAM(S): at 17:19

## 2022-06-21 RX ADMIN — Medication 30 MILLIGRAM(S): at 00:24

## 2022-06-21 RX ADMIN — HEPARIN SODIUM 5000 UNIT(S): 5000 INJECTION INTRAVENOUS; SUBCUTANEOUS at 11:35

## 2022-06-21 RX ADMIN — Medication 975 MILLIGRAM(S): at 09:37

## 2022-06-21 RX ADMIN — Medication 600 MILLIGRAM(S): at 23:31

## 2022-06-21 RX ADMIN — HEPARIN SODIUM 5000 UNIT(S): 5000 INJECTION INTRAVENOUS; SUBCUTANEOUS at 00:27

## 2022-06-21 NOTE — CHART NOTE - NSCHARTNOTEFT_GEN_A_CORE
Attending Note    Patient evaluated at bedside. Patient reports starting to feel rectal pressure. VE FD/100/0. Trial pushes performed. Will allow descent of fetal vtx to resume pushing. Patient of High Semi Fowlers position. category II tracing. Will continue resuscitative measures. Anticipate > Beauvil
Attending Note  Patient c/o rectal pressure. VE 8/90/0   early decels. VS T 98  P 106  R 18 /75  o2 sat 100%RA. Will continue to monitor>Anticipate . MBeauvil
Attending note    Was notified by resident regarding 5min prolonged spontaneous deceleration. ISE placed VE exam at that time 580/2. Pitocin turned off.  Arrived at patients bedside 25 min later. FHR tracing reviewed. Patient assessed at bedside. FHR now 140s; reactive responsive to resuscitative measures.  Patient offers no complaints.  VE T 98.6  P 100 R 18  /85 O2 sat 100RA     Heent nl  abd soft AGA  toco ctx q 3 -4min   VE 7/80/0      A: 37 yo P0 with IUP 39wks category II, GBS in urine during pregnancy  P: Continue Ampicillin     patient placed on left lateral position on peanut ball     Anticipate      MBeauvil
Attending Note    35 yo P0 with IUP 39+ wks ga admitted with ? ROM this morning. Patient is s/p epidural for pain relief stating " I have zero tolerance for pain."  Patient evaluated at bedside. Reports feeling much more comfortable after epidural. Denies any pregnancy complications. Patient currently on oral cytotec  last dose 9:30am . VS T 36.7  P 80  R 18  /69  O 2 sat 99% RA    Heent nl  abd soft toco ctx q 4-8min    moderate variability category I  VE 2/70/-3 intact membranes  labs: H/H  12.1/37.3  AmniSUre negative    A: 35 yo P0 with IUP 39 wks ga GBS neg, early labor  P: Continue with IOL; switch to pitocin with next assessment     Plan of care discussed with patient and OB team      ANticipate       MBeauvil
Attending Note    Patient evaluated at bedside due to rectal pressure. VE /0 toco ctx q 5-6min  reactive. Will restart pitocin at 2mu/min. FHR 145s reactive with occasional early decels. Anticipate . Plan of care discussed with patient and SKYE team. Odalysil
Attending Note    Patient evaluated at bedside. Breast feeding. Lochia light. No dysuria. Ambulating. Tolerating regular diet. + flatus.   Vs T 36.6 P 78  R 18  Bp 114/74  O 2 sat 97% RA    Heent nl  abd soft Incision C/D/I  No calf tenderness b/l  NEuro AAO3  labs: H/H 9.8/30.6     A: 37 yo POD#1 s/p Primary CS for category II, anemia clinically stable  P: Routine postop care      Iron/VitC      DC planning POD#2      MBeauvil

## 2022-06-21 NOTE — LACTATION INITIAL EVALUATION - LACTATION INTERVENTIONS
assisted to put baby to left breast in football hold position with deep latch and baby noted to suck with stimulation/initiate/review safe skin-to-skin/initiate/review hand expression/initiate/review techniques for position and latch/initiate/review breast massage/compression/reviewed components of an effective feeding and at least 8 effective feedings per day required/reviewed importance of monitoring infant diapers, the breastfeeding log, and minimum output each day/reviewed risks of unnecessary formula supplementation/reviewed risks of artificial nipples/reviewed benefits and recommendations for rooming in/reviewed feeding on demand/by cue at least 8 times a day/reviewed indications of inadequate milk transfer that would require supplementation

## 2022-06-22 LAB — GLUCOSE BLDC GLUCOMTR-MCNC: 84 MG/DL — SIGNIFICANT CHANGE UP (ref 70–99)

## 2022-06-22 RX ADMIN — Medication 600 MILLIGRAM(S): at 23:51

## 2022-06-22 RX ADMIN — Medication 975 MILLIGRAM(S): at 21:49

## 2022-06-22 RX ADMIN — Medication 600 MILLIGRAM(S): at 12:11

## 2022-06-22 RX ADMIN — Medication 600 MILLIGRAM(S): at 05:20

## 2022-06-22 RX ADMIN — Medication 1 TABLET(S): at 18:06

## 2022-06-22 RX ADMIN — OXYCODONE HYDROCHLORIDE 5 MILLIGRAM(S): 5 TABLET ORAL at 18:35

## 2022-06-22 RX ADMIN — Medication 1 MILLIGRAM(S): at 18:06

## 2022-06-22 RX ADMIN — Medication 600 MILLIGRAM(S): at 18:06

## 2022-06-22 RX ADMIN — Medication 600 MILLIGRAM(S): at 12:41

## 2022-06-22 RX ADMIN — HEPARIN SODIUM 5000 UNIT(S): 5000 INJECTION INTRAVENOUS; SUBCUTANEOUS at 18:06

## 2022-06-22 RX ADMIN — Medication 975 MILLIGRAM(S): at 22:19

## 2022-06-22 RX ADMIN — Medication 600 MILLIGRAM(S): at 18:36

## 2022-06-22 RX ADMIN — OXYCODONE HYDROCHLORIDE 5 MILLIGRAM(S): 5 TABLET ORAL at 19:05

## 2022-06-22 RX ADMIN — OXYCODONE HYDROCHLORIDE 5 MILLIGRAM(S): 5 TABLET ORAL at 23:58

## 2022-06-22 RX ADMIN — Medication 325 MILLIGRAM(S): at 18:07

## 2022-06-22 RX ADMIN — Medication 600 MILLIGRAM(S): at 05:50

## 2022-06-22 NOTE — PROGRESS NOTE ADULT - ATTENDING COMMENTS
PT SEEN AND EVALUATED  DOING WELL  +BM  AWAITING BILI LEVEL FOR BABY AND IF WNL CAN DC HOME LATER TODAY--IF NOT THEN MAY STAY AND RE-EVALUATE IN AM

## 2022-06-23 ENCOUNTER — TRANSCRIPTION ENCOUNTER (OUTPATIENT)
Age: 37
End: 2022-06-23

## 2022-06-23 VITALS
HEART RATE: 77 BPM | DIASTOLIC BLOOD PRESSURE: 67 MMHG | RESPIRATION RATE: 18 BRPM | SYSTOLIC BLOOD PRESSURE: 122 MMHG | TEMPERATURE: 97 F | OXYGEN SATURATION: 99 %

## 2022-06-23 RX ORDER — FOLIC ACID 0.8 MG
0 TABLET ORAL
Qty: 0 | Refills: 0 | DISCHARGE

## 2022-06-23 RX ORDER — ASPIRIN/CALCIUM CARB/MAGNESIUM 324 MG
0 TABLET ORAL
Qty: 0 | Refills: 0 | DISCHARGE

## 2022-06-23 RX ORDER — ACETAMINOPHEN 500 MG
3 TABLET ORAL
Qty: 0 | Refills: 0 | DISCHARGE
Start: 2022-06-23

## 2022-06-23 RX ORDER — IBUPROFEN 200 MG
1 TABLET ORAL
Qty: 0 | Refills: 0 | DISCHARGE
Start: 2022-06-23

## 2022-06-23 RX ADMIN — Medication 600 MILLIGRAM(S): at 13:25

## 2022-06-23 RX ADMIN — Medication 975 MILLIGRAM(S): at 14:35

## 2022-06-23 RX ADMIN — Medication 975 MILLIGRAM(S): at 09:45

## 2022-06-23 RX ADMIN — OXYCODONE HYDROCHLORIDE 5 MILLIGRAM(S): 5 TABLET ORAL at 00:28

## 2022-06-23 RX ADMIN — Medication 975 MILLIGRAM(S): at 14:06

## 2022-06-23 RX ADMIN — Medication 975 MILLIGRAM(S): at 03:12

## 2022-06-23 RX ADMIN — SIMETHICONE 80 MILLIGRAM(S): 80 TABLET, CHEWABLE ORAL at 12:32

## 2022-06-23 RX ADMIN — Medication 1 TABLET(S): at 12:27

## 2022-06-23 RX ADMIN — Medication 600 MILLIGRAM(S): at 05:56

## 2022-06-23 RX ADMIN — Medication 600 MILLIGRAM(S): at 00:21

## 2022-06-23 RX ADMIN — Medication 1 MILLIGRAM(S): at 12:28

## 2022-06-23 RX ADMIN — Medication 975 MILLIGRAM(S): at 08:40

## 2022-06-23 RX ADMIN — HEPARIN SODIUM 5000 UNIT(S): 5000 INJECTION INTRAVENOUS; SUBCUTANEOUS at 05:56

## 2022-06-23 RX ADMIN — Medication 325 MILLIGRAM(S): at 12:27

## 2022-06-23 RX ADMIN — OXYCODONE HYDROCHLORIDE 5 MILLIGRAM(S): 5 TABLET ORAL at 12:32

## 2022-06-23 RX ADMIN — Medication 600 MILLIGRAM(S): at 12:27

## 2022-06-23 RX ADMIN — SIMETHICONE 80 MILLIGRAM(S): 80 TABLET, CHEWABLE ORAL at 08:40

## 2022-06-23 RX ADMIN — Medication 600 MILLIGRAM(S): at 06:26

## 2022-06-23 RX ADMIN — OXYCODONE HYDROCHLORIDE 5 MILLIGRAM(S): 5 TABLET ORAL at 08:53

## 2022-06-23 RX ADMIN — OXYCODONE HYDROCHLORIDE 5 MILLIGRAM(S): 5 TABLET ORAL at 09:45

## 2022-06-23 RX ADMIN — Medication 975 MILLIGRAM(S): at 02:42

## 2022-06-23 RX ADMIN — OXYCODONE HYDROCHLORIDE 5 MILLIGRAM(S): 5 TABLET ORAL at 13:30

## 2022-06-23 NOTE — DISCHARGE NOTE OB - HOSPITAL COURSE
Patient admitted for labor and delivery. She underwent primary  section for abnormal fetal status in labor delivering a viable boy. Patient had uncomplicated hospital course. She was discharged home on postpartum day 3 in good condition.

## 2022-06-23 NOTE — PROGRESS NOTE ADULT - SUBJECTIVE AND OBJECTIVE BOX
Pain Service Follow-up  Postop Day  1    S/P  C- Section    T(C): 36.6 (06-21-22 @ 06:22), Max: 36.7 (06-20-22 @ 14:09)  HR: 78 (06-21-22 @ 06:22) (71 - 96)  BP: 114/74 (06-21-22 @ 06:22) (105/60 - 117/59)  RR: 18 (06-21-22 @ 06:22) (18 - 19)  SpO2: 97% (06-21-22 @ 06:22) (97% - 100%)  Wt(kg): --      THERAPY:     S/P Epidural Morphine    Sedation Score:	  [X] Alert	      [  ] Drowsy       [  ] Arousable	[  ] Asleep         [  ] Unresponsive    Side Effects:	  [X] None	      [  ] Nausea       [  ] Pruritus        [  ] Weakness   [  ] Numbness        ASSESSMENT/ PLAN   [ X ] Discontinue         [  ] Continue    [ X ] Documentation and Verification of current medications       Satisfactory Post Anesthetic Course    
S:   37yo  POD#2 s/p pLTCS for Cat II tracing. Pain is well controlled. She is tolerating a regular diet, passing flatus, voiding spontaneously, and ambulating without difficulty. Denies CP, SOB, lightheadedness, dizziness, N/V.    O:  Vital Signs Last 24 Hrs  T(C): 37.1 (2022 05:59), Max: 37.1 (2022 05:59)  T(F): 98.8 (2022 05:59), Max: 98.8 (2022 05:59)  HR: 84 (2022 05:59) (76 - 84)  BP: 120/61 (2022 05:59) (111/72 - 120/61)  BP(mean): --  RR: 18 (2022 05:59) (18 - 18)  SpO2: 100% (2022 05:59) (97% - 100%)    MEDICATIONS  (STANDING):  acetaminophen     Tablet .. 975 milliGRAM(s) Oral <User Schedule>  diphtheria/tetanus/pertussis (acellular) Vaccine (ADAcel) 0.5 milliLiter(s) IntraMuscular once  ferrous    sulfate 325 milliGRAM(s) Oral daily  folic acid 1 milliGRAM(s) Oral daily  heparin   Injectable 5000 Unit(s) SubCutaneous every 12 hours  ibuprofen  Tablet. 600 milliGRAM(s) Oral every 6 hours  prenatal multivitamin 1 Tablet(s) Oral daily      MEDICATIONS  (PRN):  diphenhydrAMINE 25 milliGRAM(s) Oral every 6 hours PRN Pruritus  lanolin Ointment 1 Application(s) Topical every 6 hours PRN Sore Nipples  magnesium hydroxide Suspension 30 milliLiter(s) Oral two times a day PRN Constipation  oxyCODONE    IR 5 milliGRAM(s) Oral once PRN Moderate to Severe Pain (4-10)  oxyCODONE    IR 5 milliGRAM(s) Oral every 3 hours PRN Moderate to Severe Pain (4-10)  simethicone 80 milliGRAM(s) Chew every 4 hours PRN Gas    Labs:  Blood type: A Positive  Rubella IgG: RPR: Negative                          9.8<L>   13.13<H> >-----------< 195    (  @ 06:00 )             30.6<L>    PE:  General: A&O x3, NAD  Breasts: Nontender, non-engorged  Abdomen: Soft, appropriately tender, incision c/d/i. Steri-strips intact  Lochia: WNL  Extremities: Trace edema noted in b/l lower legs and feet. No calf tenderness or pain    A/P:   37yo  POD#2 s/p pLTCS.  Patient is stable and doing well post-operatively.    - Continue regular diet.  - Increase ambulation  - Encouraged use of abdominal binder  - Continue motrin, tylenol, oxycodone PRN for pain control.    - Discharge planning pending 's bilirubin result    Maria Eshter WANG-BC
SUBJECTIVE:  Pain: well controlled  Complaints: none  MILESTONES:  Alert and oriented x 3  [ x ]  Out of bed/ ambulating. [ x ]  Flatus: [ x ]  Postive [  ] Negative   Bowel movement  [ x ] Positive [  ] Negative   Voiding [x  ] Due to void [  ]   Diet: Regular [ x ]  Clears [  ]  NPO [  ]  Infant feeding:  Breast [x  ]   Bottle [  ]  Both [  ]  Feeding related inssues and/or concerns: none    OBJECTIVE:  T(C): 36.5 (22 @ 05:52), Max: 36.8 (22 @ 18:10)  HR: 73 (22 @ 05:52) (73 - 82)  BP: 117/63 (22 @ 05:52) (117/63 - 125/84)  RR: 18 (22 @ 05:52) (18 - 18)  SpO2: 100% (22 @ 05:52) (98% - 100%)  Wt(kg): --      MEDICATIONS  (STANDING):  acetaminophen     Tablet .. 975 milliGRAM(s) Oral <User Schedule>  diphtheria/tetanus/pertussis (acellular) Vaccine (ADAcel) 0.5 milliLiter(s) IntraMuscular once  ferrous    sulfate 325 milliGRAM(s) Oral daily  folic acid 1 milliGRAM(s) Oral daily  heparin   Injectable 5000 Unit(s) SubCutaneous every 12 hours  ibuprofen  Tablet. 600 milliGRAM(s) Oral every 6 hours  prenatal multivitamin 1 Tablet(s) Oral daily    MEDICATIONS  (PRN):  diphenhydrAMINE 25 milliGRAM(s) Oral every 6 hours PRN Pruritus  lanolin Ointment 1 Application(s) Topical every 6 hours PRN Sore Nipples  magnesium hydroxide Suspension 30 milliLiter(s) Oral two times a day PRN Constipation  oxyCODONE    IR 5 milliGRAM(s) Oral once PRN Moderate to Severe Pain (4-10)  oxyCODONE    IR 5 milliGRAM(s) Oral every 3 hours PRN Moderate to Severe Pain (4-10)  simethicone 80 milliGRAM(s) Chew every 4 hours PRN Gas      ASSESSMENT:  36y  y/o G  1 P 1   PO Day# 3       Delivery: Primary [ x ]    Repeat [  ]                                       Indication of procedure: abnormal FHR  Condition: Stable  Past Medical History significant for: HPI:  36 year old  at 39 weeks presents with c/o leaking of fluid since 0120, states the fluid was clear but now is bloody. Reports feeling contractions pain 2/10 does not desire pain medication at this time. Denies recent intercourse. States good fetal movement. Denies any ob complications.   PMH: Covid 2021  PSH: Denies   Allergies: Denies   Denies history of anxiety, depression  Denies use of etoh, drugs, tobacco during pregnancy  (2022 05:27)    Current Issues: none  Heart:         RRR                     Lungs: clear  Breasts:  Soft [ x ]   Engorged [  ]  Abdomen: Soft [  x] , distended [  ] nontender [x  ]   Bowel sounds :  Present [x  ]  Absent [  ]   Fundus firm [ x ]  Boggy [  ]  Abdominal incision: Clean, dry and intact [ x ]  Staples [  ] Steri Strips [x  ] Dermabond [  ] Sutures [  ] QUINN ( )  Patient wearing abdominal binder for support.  Vaginal: Lochia:  Heavy [  ]  Moderate [  ]   Scant [x  ]  Extremities: Edema [11+  ] negative Althea's Sign [ x ] Nontender Rashel  [x  ] Positive pedal pulses [ x ]  Other relevant physical exam findings:none      PLAN:  Plan: Increase ambulation, analgesia PRN and pain medication protocol standing oxycodone, ibuprofen and acetaminophen.  Diet: Regular diet  Continue routine post-operative and postpartum care.   Discharge Planning [ x ]  Consults:   Social Work [  ] Lacation [  ] Other [  ]   
OB Postpartum Note:  Delivery    S: 35yo now POD #1 s/p LTCS. Her pain is well controlled. She is tolerating a regular diet and passing flatus. Voiding spontaneously and ambulating without difficulty. Denies N/V. Denies CP/SOB/lightheadedness/dizziness.     O:   Vitals:  Vital Signs Last 24 Hrs  T(C): 36.6 (2022 06:22), Max: 36.7 (2022 14:09)  T(F): 97.9 (2022 06:22), Max: 98.1 (2022 14:09)  HR: 78 (2022 06:22) (71 - 96)  BP: 114/74 (2022 06:22) (105/60 - 117/59)  BP(mean): --  RR: 18 (2022 06:22) (18 - 19)  SpO2: 97% (2022 06:22) (97% - 100%)    MEDICATIONS  (STANDING):  acetaminophen     Tablet .. 975 milliGRAM(s) Oral <User Schedule>  diphtheria/tetanus/pertussis (acellular) Vaccine (ADAcel) 0.5 milliLiter(s) IntraMuscular once  ferrous    sulfate 325 milliGRAM(s) Oral daily  folic acid 1 milliGRAM(s) Oral daily  heparin   Injectable 5000 Unit(s) SubCutaneous every 12 hours  ibuprofen  Tablet. 600 milliGRAM(s) Oral every 6 hours  prenatal multivitamin 1 Tablet(s) Oral daily    MEDICATIONS  (PRN):  diphenhydrAMINE 25 milliGRAM(s) Oral every 6 hours PRN Pruritus  lanolin Ointment 1 Application(s) Topical every 6 hours PRN Sore Nipples  magnesium hydroxide Suspension 30 milliLiter(s) Oral two times a day PRN Constipation  oxyCODONE    IR 5 milliGRAM(s) Oral every 3 hours PRN Moderate to Severe Pain (4-10)  oxyCODONE    IR 5 milliGRAM(s) Oral once PRN Moderate to Severe Pain (4-10)  simethicone 80 milliGRAM(s) Chew every 4 hours PRN Gas      Labs:  Blood type: A Positive  Rubella IgG: RPR: Negative                          9.8<L>   13.13<H> >-----------< 195    (  @ 06:00 )             30.6<L>                        12.1   8.84 >-----------< 275    (  @ 05:15 )             37.3                  PE:  General: NAD, patient resting comfortably in bed  Abdomen: Mildly distended, appropriately tender  Incision: Clean, dry, intact.  Extremities: SCDs in place, no erythema, 1+ edema bilaterally

## 2022-06-23 NOTE — DISCHARGE NOTE OB - PLAN OF CARE
regular diet, activities as tolerated, nothing per vagina, follow up with obstetrician for postop visit in10-14 days

## 2022-06-23 NOTE — DISCHARGE NOTE OB - PATIENT PORTAL LINK FT
You can access the FollowMyHealth Patient Portal offered by [facility] by registering at the following website: [email or web address]. By joining [facility] FollowMyHealth portal, you will also be able to view your health information using other applications (apps) compatible with our system.

## 2022-06-23 NOTE — DISCHARGE NOTE OB - CARE PLAN
Principal Discharge DX:	 delivery delivered  Assessment and plan of treatment:	regular diet, activities as tolerated, nothing per vagina, follow up with obstetrician for postop visit in10-14 days   1

## 2022-06-23 NOTE — DISCHARGE NOTE OB - CARE PROVIDER_API CALL
Milagro Campuzano)  Obstetrics and Gynecology  [address] [place] [address]  Phone: [phone]  Fax: [phone]  Follow Up Time:

## 2022-06-23 NOTE — DISCHARGE NOTE OB - FOUL SMELLING VAGINAL DISCHARGE
Appointment Confirmation   Person confirmed appointment with  If not patient, name of the person N/A    Date and time of appointment 04/25   Patient acknowledged and will be at appointment? NO   Did you advise the patient that they will need a urine sample if they are a new patient? NO   Did you advise the patient to bring their current medications for verification? (including any OTC) NO   Additional Information Unable to leave message   Phone line was busy Statement Selected

## 2022-06-23 NOTE — DISCHARGE NOTE OB - NS MD DC FALL RISK RISK
For information on Fall & Injury Prevention, visit: [email or web address] OR  [email or web address] OR  https://www.cdc.gov/steadi/patient.html

## 2022-06-23 NOTE — PROGRESS NOTE ADULT - ASSESSMENT
A/P: 37yo POD #1 s/p pLTCS for NRFHT, EBL 256cc.  Patient is stable and doing well post-operatively.    - Continue regular diet.  - Increase ambulation.  - PO pain medication with Tylenol, Motrin and Oxycodone PRN for pain control.    - DVT prophylaxis with Heparin 5000u BID    Amyeo Jereen PGY-1  
Note reviewed. Agree with above assessment and planning. Patient is doing well postoperatively, met mile stones and stable for discharge.  MD nesha

## 2022-06-23 NOTE — DISCHARGE NOTE OB - MEDICATION SUMMARY - MEDICATIONS TO STOP TAKING
I will STOP taking the medications listed below when I get home from the hospital:    folic acid    aspirin 81 mg oral tablet, chewable

## 2022-07-07 LAB — SURGICAL PATHOLOGY STUDY: SIGNIFICANT CHANGE UP

## 2022-07-25 NOTE — OB RN PATIENT PROFILE - IF RESULT GREATER THAN 35 DAYS OLD SELECT STATUS
Pediatric Well Child Exam: 7- 8 Years of age    Chief Complaint   Patient presents with   • Well Child   • Well Child       SUBJECTIVE:  Reg Lindsay is a 5 year old male who presents for a  well child exam.  Patient presents  with Mother.    Preferred method of results communication:     Cell Phone:   Telephone Information:   Mobile [phone]   Mobile [phone]     Okay to leave a message containing results? Yes    CONCERNS RAISED TODAY: none    DIET/GI:  Appetite: Picky dislikes vegetables.  Milk: 1 Percent, 1 cups/day.  Food:   · Eats fruits/vegetables: [x]  YES     []  NO   · Eats meat: [x]  YES     []  NO   Problems with bowel movements: []  YES     [x]  NO     PHYSICAL ACTIVITY        Playtime (60 min/day): [x]  YES     []  NO         Electronic Screen time 2-3 hours/day:     SLEEP PATTERN:   10-11 hours/night.    SCHOOL HISTORY:  Facility Type: Attending [facility] [employer] - name[facility] [employer].  Grade in school: Second Grade    VISION SCREENING:    Hearing Screening    125Hz 250Hz 500Hz 1000Hz 2000Hz 3000Hz 4000Hz 6000Hz 8000Hz   Right ear:            Left ear:               Visual Acuity Screening    Right eye Left eye Both eyes   Without correction: 20/30 20/40 20/25   With correction:          DEVELOPMENT:  [x]  YES    []  NO      []  UNKNOWN    Has success in school?  [x]  YES    []  NO      []  UNKNOWN    Has friends/does well socially?  [x]  YES    []  NO      []  UNKNOWN    Participates in after school/outside          activities?  [x]  YES    []  NO      []  UNKNOWN    Gets along with family?  [x]  YES    []  NO      []  UNKNOWN    Does chores when asked?  [x]  YES    []  NO      []  UNKNOWN    Given chances to make own decisions?  [x]  YES    []  NO      []  UNKNOWN    Feels good about self/generally happy?    OBJECTIVE:  PAST HISTORIES:  Allergies, Medications, Medical history, Surgical history, Family history reviewed and updated.  Toxic Exposure:   Tobacco Use: Not Asked       IMMUNIZATION  STATUS: Not up to date.  Needed immunizations include: IPV and Td  IMMUNIZATION REACTIONS: None  VARICELLA STATUS: confirmed by vaccine administration    RECENT HEALTH EVENTS:  Illnesses: None.  Hospitalizations: None.  Injuries or Accidents: None.    REVIEW OF SYSTEMS:    All systems reviewed and negative except as documented in \"Concerns raised\".    PHYSICAL EXAM:      VITAL SIGNS:   Visit Vitals  /68 (BP Location: LUE - Left upper extremity, Patient Position: Sitting, Cuff Size: Pediatric)   Pulse 103   Resp 20   Ht 4' 4.75\" (1.34 m)   Wt 31.1 kg (68 lb 9.6 oz)   SpO2 99%   BMI 17.33 kg/m²    89 %ile (Z= 1.23) based on [facility] (Boys, 2-20 Years) weight-for-age data using vitals from 7/25/2022.  GENERAL:  Well appearing male child.  Alert and active.  SKIN:  Warm, normal turgor.  No cyanosis.  No rash.  HEAD:  Normocephalic, atraumatic.    EYES:  Conjunctivae appear normal, noninjected, nonicteric, positive red reflex.  NOSE:  Appears normal without drainage.  EARS:  Normal external auditory canals. Tympanic membraness are transparent with normal landmarks.  THROAT:  Moist mucous membranes without lesions.  NECK:  Supple, no lymphadenopathy or masses.  HEART:  Regular rate and rhythm.  Normal S1, S2.  No murmurs, rubs, gallops.   LUNGS:  Clear to auscultation.  No wheezes, rales, rhonchi.  Normal work effort with breathing.  ABDOMEN:  Bowel sounds present. Soft, non tender.  No hepatomegaly.  No splenomegaly.  No masses.  GENITOURINARY:Deferred  EXTREMITIES: Symmetrical muscle mass, strength all extremities.  No effusions.   BACK: Spine straight.  No costovertebral angle tenderness.      NEUROLOGIC:  Oriented x4. No gross lateralizing signs or focal deficits.  Normal gait. Normal deep tendon reflexes.       Assessment:  5 year old male well child.    Plan:  1. All parental concerns and questions discussed.  2. Anticipatory guidance provided, handout/s given Safety: Car, bicycle, fire, sharp objects, falls,  water  3. Development  4. Diet  5. Discipline  6. Television  7. Analgesics/antipyretics  8. Sun exposure  9. Tobacco-free home  10. Dental care  11. Lead exposure risk: None.  12. Immunizations per orders.  Risks, benefits, and side effects discussed. VIS for Immunizations given.  13. Orders for immunizations given today per the recommended schedule.    Follow up: Return in about 1 year (around 7/25/2023) for Well Child Visit.     Marya Saucedo PAMichiC  Supervising physician: Dr. Alli Black    Unknown

## 2022-08-27 NOTE — OB RN PATIENT PROFILE - FALL HARM RISK - CONCLUSION
Gen: AAOx3, NAD, well nourished  HEENT: Normocephalic atraumatic. EOMI. No scleral icterus. Moist mucus membranes.  CV: RRR. Audible S1 and S2. No murmurs. 2+ radial and PT pulses   Pulm: Clear to auscultation bilaterally. No wheezes, rales, or rhonchi. No accessory muscle use or respiratory distress.  Abddomen: soft, normoactive BS, non distended, tender to palpation periumbilical, no rebound, no guarding. ~5" surgical incision over RLQ, edges well approximated, no erythema or drainage. RAMIREZ drain in place, ~2cc serosanguinous fluid inside. Drain site nontender, no erythema or drainage.  Musculoskeletal:  Moving all extremities equally. No gross deformity. No tenderness to palpation.  Skin: No rashes or lesions. Warm.  Neurologic: No focal neurological deficits. CN II-XII grossly intact.  : no CVA tenderness  Psych: Appropriate mood and affect. Cooperative. Universal Safety Interventions

## 2022-10-06 PROBLEM — U07.1 COVID-19: Chronic | Status: ACTIVE | Noted: 2022-06-19

## 2022-10-10 ENCOUNTER — APPOINTMENT (OUTPATIENT)
Dept: ORTHOPEDIC SURGERY | Facility: CLINIC | Age: 37
End: 2022-10-10

## 2022-10-10 VITALS
HEIGHT: 61 IN | WEIGHT: 132 LBS | BODY MASS INDEX: 24.92 KG/M2 | SYSTOLIC BLOOD PRESSURE: 148 MMHG | HEART RATE: 76 BPM | DIASTOLIC BLOOD PRESSURE: 92 MMHG

## 2022-10-10 PROCEDURE — 99204 OFFICE O/P NEW MOD 45 MIN: CPT | Mod: 25

## 2022-10-10 PROCEDURE — 20550 NJX 1 TENDON SHEATH/LIGAMENT: CPT | Mod: 59

## 2022-10-11 NOTE — ASSESSMENT
[FreeTextEntry1] : 36 year-old female with bilateral De Quervain tenosynovitis now s/p corticosteroid injection bilaterally\par \par Plan:\par Should symptoms return can f/u in 1 month for either repeat injection or further surgical discussion\par Activity as tolerated and follow-up as above, if needed

## 2022-10-11 NOTE — PHYSICAL EXAM
[de-identified] : General: NAD\par RSP: Nonlabored\par MSK:\par Bilateral Upper Extremity seen and examined\par Tender at radial styloid and first dorsal compartment R > L\par Bilaerally positive Finkelstein and Eichoff maneuver\par 5/5 strength throughout\par SILT throughout\par Full active symmetric ROM\par 2+ radial pulse

## 2022-10-11 NOTE — HISTORY OF PRESENT ILLNESS
[FreeTextEntry1] : 36 year-old right-hand dominant female who is 8 months postpartum who presents with R > L wrist pain along the first dorsal compartments.  She reports that this has been worsening as her child has gotten heavier to lift.  Hurts her with ulnar deviation of the wrists as well as with activities that include lifting weighted objects.  She reports that the right side is worse than the left.  Denies numbness and parasthesias.  Has a friend who was treated with a steroid injection for De Quervain tenosynovitis in the past so understands that is the likely diagnosis.

## 2022-11-16 ENCOUNTER — APPOINTMENT (OUTPATIENT)
Dept: ORTHOPEDIC SURGERY | Facility: CLINIC | Age: 37
End: 2022-11-16
Payer: COMMERCIAL

## 2022-11-16 PROCEDURE — 99214 OFFICE O/P EST MOD 30 MIN: CPT | Mod: 25

## 2022-11-16 PROCEDURE — 20550 NJX 1 TENDON SHEATH/LIGAMENT: CPT | Mod: 50

## 2023-01-12 NOTE — OB PROVIDER DELIVERY SUMMARY - NS_LABORONSET_OBGYN_ALL_OB_DT
- Patient reports having hypotension with dizziness yesterday. According to patient 's. -Current BP is stable. Recommend to monitor BG at home which could have been because of dizziness given normal blood pressure readings. 19-Jun-2022 16:15

## 2023-03-29 ENCOUNTER — APPOINTMENT (OUTPATIENT)
Dept: ORTHOPEDIC SURGERY | Facility: CLINIC | Age: 38
End: 2023-03-29

## 2023-04-05 ENCOUNTER — APPOINTMENT (OUTPATIENT)
Dept: ORTHOPEDIC SURGERY | Facility: CLINIC | Age: 38
End: 2023-04-05
Payer: COMMERCIAL

## 2023-04-05 DIAGNOSIS — M65.4 RADIAL STYLOID TENOSYNOVITIS [DE QUERVAIN]: ICD-10-CM

## 2023-04-05 PROCEDURE — 99214 OFFICE O/P EST MOD 30 MIN: CPT | Mod: 57

## 2023-04-30 NOTE — PHYSICAL EXAM
[de-identified] : Patient is alert, oriented and in no acute distress. Affect and general appearance are normal and patient is able to answer questions appropriately. Patient is able to make a tight fist. A focused exam of the upper extremities reveals full painless motion of the elbow, wrist and fingers. Patient has tenderness over the first dorsal compartment bilaterally. Thumb extension strength is limited by pain. Positive Finkelstein's test.   \par \par Neurologic: Median, ulnar, and radial motor and sensory are intact.  \par Skin: No cyanosis, clubbing, edema or rashes.  \par Vascular: Radial pulses intact.  \par Lymphatic: No streaking or epitrochlear adenopathy.

## 2023-04-30 NOTE — HISTORY OF PRESENT ILLNESS
[FreeTextEntry1] : 37 year-old right-hand dominant female returns with 1 month of progressive symptoms after her second bilateral DeQuervains injection.  She is hoping for a third injection today.  I counseled again that I do not perform more than two for this condition and at this point favor either continuing nonoperative treatment with bracing and NSAIDs or operative intervention.

## 2023-04-30 NOTE — ASSESSMENT
[FreeTextEntry1] : 36 year old female presents with DeQuervain's tenosynovitis of the bilateral wrists s/p 2 prior injections\par \par Plan:\par Bracing and NSAIDs for symptomatic relief\par Can call to schedule surgery should she wish to proceed with operative intervention

## 2023-09-23 NOTE — OB PROVIDER H&P - NSICDXPASTSURGICALHX_GEN_ALL_CORE_FT
Carlyn Padgett (32 y.o. Female)       Date of Birth   1991    Social Security Number       Address   [address] [place] [address]    Home Phone   [phone]    MRN   [id number]       Catholic   Muslim    Marital Status                               Admission Date   9/21/23    Admission Type   Elective    Admitting Provider   Chelsea Walsh MD    Attending Provider   Chelsea Walsh MD    Department, Room/Bed   [facility] MOTHER BABY, M754/1       Discharge Date       Discharge Disposition   Home or Self Care    Discharge Destination                                 Attending Provider: Chelsea Walsh MD    Allergies: Apple, Other    Isolation: None   Infection: None   Code Status: CPR    Ht: --   Wt: 107 kg (235 lb)    Admission Cmt: None   Principal Problem: Gestational diabetes mellitus (GDM) in third trimester controlled on oral hypoglycemic drug [O24.415]                   Active Insurance as of 9/21/2023       Primary Coverage       Payor Plan Insurance Group Employer/Plan Group    [employer] [place] [employer] [place]ICA[place]        Payor Plan Address Payor Plan Phone Number Payor Plan Fax Number Effective Dates    [address] [address] [phone]  4/23/2017 - None Entered    [facility] [address]         Subscriber Name Subscriber Birth Date Member ID       CARLYN PADGETT 1991 [id number]                     Emergency Contacts        (Rel.) Home Phone Work Phone Mobile Phone    Kaylen Groves (Mother) [phone] -- --                 History & Physical        Chelsea Walsh MD at 09/21/23 0703          H&P reviewed. The patient was examined and there are no changes to the H&P. SVE per RN 1-2 cm. FHT cat 1. Given 1 dose of SL Cytotec, had prolonged decel. Recovered with repositioning, IVF, terbutaline to cat 1. GBS negative. May have epidural when desired.    This document has  been electronically signed by Chelsea Walsh MD on 2023 07:05 CDT.    Electronically signed by Chelsea Walsh MD at 23 0706   Source Note          [facility]  HISTORY & PHYSICAL - Obstetrics    Name: Carlyn Aguila  MRN: [id number]  Location: Room/bed info not found  Date: 2023   CSN: [id number]      CHIEF COMPLAINT:  IOL at 38 wks secondary to GDMA2    HISTORY OF PRESENT ILLNESS  Carlyn Aguila is a 32 y.o.  at 34w0d who presents today for RPN.  She is scheduled for IOL at 38w0d secondary to GDMA2.  Today, denies LOF, vaginal bleeding, or contractions.  Reports good FM.    Patient denies any chest pain, palpitations, headaches, lightheadedness, shortness of breath, cough, nausea, vomiting, diarrhea, constipation, fever, or chills.    ROS  Review of Systems   Constitutional: Negative.    HENT: Negative.     Eyes: Negative.    Respiratory: Negative.     Cardiovascular: Negative.    Gastrointestinal: Negative.    Endocrine: Negative.    Genitourinary: Negative.    Musculoskeletal: Negative.    Skin: Negative.    Allergic/Immunologic: Negative.    Neurological: Negative.    Hematological: Negative.    Psychiatric/Behavioral: Negative.       PRENATAL LAB RESULTS  Prenatal labs reviewed  External Prenatal Results       Pregnancy Outside Results - Transcribed From Office Records - See Scanned Records For Details       Test Value Date Time    ABO  B  23    Rh  Positive  23    Antibody Screen  Negative  23    Varicella IgG       Rubella  <0.90 index 23    Hgb  9.9 g/dL 23 09       12.3 g/dL 23    Hct  29.6 % 23 09       36.8 % 23    Glucose Fasting GTT       Glucose Tolerance Test 1 hour       Glucose Tolerance Test 3 hour       Gonorrhea (discrete)  Negative  23    Chlamydia (discrete)  Negative  23    RPR  Non-Reactive  23     VDRL       Syphilis Antibody       HBsAg  Non-Reactive  03/06/23 0914    Herpes Simplex Virus PCR       Herpes Simplex VIrus Culture  Negative  05/29/18 1657    HIV  Non-Reactive  03/06/23 0914    Hep C RNA Quant PCR ^ NONREACTIVE  07/17/20 1518    Hep C Antibody  Non-Reactive  03/06/23 0914    AFP       Group B Strep  Negative  12/06/18 0923    GBS Susceptibility to Clindamycin       GBS Susceptibility to Erythromycin       Fetal Fibronectin       Genetic Testing, Maternal Blood                 Drug Screening       Test Value Date Time    Urine Drug Screen       Amphetamine Screen  Negative  03/06/23 0914    Barbiturate Screen  Negative  03/06/23 0914    Benzodiazepine Screen  Negative  03/06/23 0914    Methadone Screen  Negative  03/06/23 0914    Phencyclidine Screen  Negative  03/06/23 0914    Opiates Screen  Negative  03/06/23 0914    THC Screen  Negative  03/06/23 0914    Cocaine Screen       Propoxyphene Screen  Negative  03/06/23 0914    Buprenorphine Screen  Negative  03/06/23 0914    Methamphetamine Screen       Oxycodone Screen  Negative  03/06/23 0914    Tricyclic Antidepressants Screen  Negative  03/06/23 0914              Legend    ^: Historical                        PRENATAL RISK FACTORS  3/23 Problems (from 03/06/23 to present)       Problem Noted Resolved    Supervision of high risk pregnancy in third trimester 8/24/2023 by Chelsea Walsh MD No    Gestational diabetes mellitus (GDM) in third trimester controlled on oral hypoglycemic drug 8/24/2023 by Chelsea Walsh MD No    Overview Signed 8/24/2023  8:18 AM by Chelsea Walsh MD     Based on BS log         Grand multiparity 5/11/2023 by Chelsea Walsh MD No    Unwanted fertility 5/11/2023 by Chelsea Walsh MD No    Overview Signed 8/10/2023  4:09 PM by Melody Bal APRN     8/10- tubal papers signed.         Rubella non-immune status, antepartum 5/11/2023 by Nico  Chelsea Junior MD No    Overview Signed 2023  8:55 AM by Chelsea Walsh MD     Needs MMR PP         History of gestational diabetes in prior pregnancy, currently pregnant in third trimester 3/16/2023 by Melody Bal APRN No    Overview Signed 2023  8:03 AM by Chelsea Walsh MD     Early glucola WNL         Obesity affecting pregnancy in third trimester 3/16/2023 by Melody Bal APRN No    Herpes simplex virus type 2 (HSV-2) infection affecting pregnancy in third trimester 2020 by Teresita Mireles, RN No    Overview Addendum 3/16/2023  4:45 PM by Melody Bal APRN     Will need suppression at 36wks, does not want mentioned in front of anyone             OB HISTORY  OB History    Para Term  AB Living   8 6 6   1 6   SAB IAB Ectopic Molar Multiple Live Births   1         6      # Outcome Date GA Lbr Jai/2nd Weight Sex Delivery Anes PTL Lv   8 Current            7 Term 21 39w0d  4030 g (8 lb 14.2 oz) M Vag-Spont EPI N KAYLEEN      Complications: Gestational diabetes   6 Term 18 38w5d 15:31 / 01:11 4150 g (9 lb 2.4 oz) M Vag-Spont EPI N KAYLEEN      Complications: Failure to Progress in First Stage   5 SAB 2017 6w0d          4 Term 03/27/15 39w0d  3856 g (8 lb 8 oz) M Vag-Spont EPI  KAYLEEN      Birth Comments: IOL    3 Term 13 37w0d  3685 g (8 lb 2 oz) M Vag-Spont None Y KAYLEEN   2 Term 10/22/10 38w1d  3430 g (7 lb 9 oz) M Vag-Spont None  KAYLEEN      Birth Comments: 6-7 cm on arrival   1 Term 08 39w0d  4564 g (10 lb 1 oz) M Vag-Spont None N KAYLEEN      Birth Comments: Stadol and Phenergan used      Complications: Fetal macrosomia   PAST MEDICAL HISTORY  Past Medical History:   Diagnosis Date    Anxiety     Asthma     Gestational diabetes     Herpes     Migraine    PAST SURGICAL HISTORY  Past Surgical History:   Procedure Laterality Date    WISDOM TOOTH EXTRACTION     FAMILY HISTORY  Family History   Problem Relation Age of Onset    No  Known Problems Mother     Cirrhosis Father     No Known Problems Brother     No Known Problems Son     No Known Problems Son     No Known Problems Son     No Known Problems Son     No Known Problems Son     No Known Problems Maternal Grandmother     No Known Problems Maternal Grandfather     Alzheimer's disease Paternal Grandmother     Breast cancer Other    SOCIAL HISTORY  Social History     Socioeconomic History    Marital status:    Tobacco Use    Smoking status: Never    Smokeless tobacco: Never   Vaping Use    Vaping Use: Never used   Substance and Sexual Activity    Alcohol use: No    Drug use: No    Sexual activity: Yes     Partners: Male     Comment: LAST PAP NORMAL 7/17/20   ALLERGIES  Allergies   Allergen Reactions    Other Anaphylaxis     APPLES   HOME MEDICATIONS  Prior to Admission medications    Medication Sig Start Date End Date Taking? Authorizing Provider   Alcohol Swabs (Alcohol Pads) 70 % pads 1 pad 4 (Four) Times a Day. 8/10/23  Yes Melody Bal APRN   cetirizine (zyrTEC) 10 MG tablet Take 1 tablet by mouth Daily. 4/13/23  Yes Melody Bal APRN   docusate sodium (Colace) 100 MG capsule Take 1 capsule by mouth 2 (Two) Times a Day. 7/21/23  Yes Chelsea Walsh MD   ferrous sulfate 325 (65 FE) MG tablet Take 1 tablet by mouth 2 (Two) Times a Day. 7/20/23  Yes Chelsea Walsh MD   glucose blood test strip Test blood sugars 4 times per day as directed. 8/10/23  Yes Melody Bla APRN   glucose monitor monitoring kit 1 each As Needed (gestational diabetes testing). May substitute with preferred, available equivalent. 8/10/23  Yes Melody Bal APRN   Lancets (onetouch ultrasoft) lancets Test blood sugars 4 times per day as directed. 8/10/23  Yes Melody Bal APRN   ondansetron (Zofran) 8 MG tablet Take 1 tablet by mouth Every 8 (Eight) Hours As Needed for Nausea or Vomiting. 3/16/23  Yes Melody Bal APRN   valACYclovir (Valtrex) 1000 MG tablet Take 1  tablet by mouth Daily. 3/16/23 8/24/23 Yes Melody Bal APRN   metFORMIN (Glucophage) 500 MG tablet Take 2 tablets by mouth every night at bedtime. 23  Chelsea Walsh MD   valACYclovir (Valtrex) 500 MG tablet Take 1 tablet by mouth 2 (Two) Times a Day. 23   Chelsea Walsh MD   PHYSICAL EXAM  /76   Wt 106 kg (233 lb 3.2 oz)   LMP  (LMP Unknown)   BMI 40.03 kg/m²   General: No acute distress. Well developed, well nourished. Pleasant.  Heart: Regular rate and rhythm. No murmurs, rubs, or gallops  Lungs: Clear to auscultation bilaterally. No wheezes, rales, or rhonchi.  Abdomen: Soft, nontender to palpation, enlarged by gravid uterus.    IMPRESSION  Carlyn Aguila is a 32 y.o.  scheduled for IOL at 38w0d secondary to GDMA2.    PLAN  1.  IOL  - Admit: Labor and Delivery  - Attending: Dr. Walsh  - Condition: Stable  - Vitals: per protocol  - Activity: ad sarwat  - Nursing: Continuous electronic fetal monitoring, as per protocol  - Diet: Clears  - IV fluids:  mL/hr  - Meds: SL Cytotec  - Allergies: Other  - Labs: CBC, T&S, UDS  - GBS: unk.  Antibiotics: unk  - Carlyn Aguila and I have discussed pain goals for this hospitalization after reviewing her current clinical condition, medical history and prior pain experiences.  The goal is to keep her pain level appropriate.  Patient may have epidural if desired.  - Anticipate     This document has been electronically signed by Chelsea Walsh MD on 2023 08:45 CDT.    Electronically signed by Chelsea Walsh MD at 23 0847                 Chelsea Walsh MD at 23 0830          [facility]  HISTORY & PHYSICAL - Obstetrics    Name: Carlyn Aguila  MRN: [id number]  Location: Room/bed info not found  Date: 2023   CSN: [id number]      CHIEF COMPLAINT:  IOL at 38 wks secondary to GDMA2    HISTORY OF PRESENT  ILLNESS  Carlyn Aguila is a 32 y.o.  at 34w0d who presents today for RPN.  She is scheduled for IOL at 38w0d secondary to GDMA2.  Today, denies LOF, vaginal bleeding, or contractions.  Reports good FM.    Patient denies any chest pain, palpitations, headaches, lightheadedness, shortness of breath, cough, nausea, vomiting, diarrhea, constipation, fever, or chills.    ROS  Review of Systems   Constitutional: Negative.    HENT: Negative.     Eyes: Negative.    Respiratory: Negative.     Cardiovascular: Negative.    Gastrointestinal: Negative.    Endocrine: Negative.    Genitourinary: Negative.    Musculoskeletal: Negative.    Skin: Negative.    Allergic/Immunologic: Negative.    Neurological: Negative.    Hematological: Negative.    Psychiatric/Behavioral: Negative.       PRENATAL LAB RESULTS  Prenatal labs reviewed  External Prenatal Results       Pregnancy Outside Results - Transcribed From Office Records - See Scanned Records For Details       Test Value Date Time    ABO  B  23    Rh  Positive  23    Antibody Screen  Negative  23    Varicella IgG       Rubella  <0.90 index 23    Hgb  9.9 g/dL 23 0905       12.3 g/dL 23    Hct  29.6 % 23 0905       36.8 % 23    Glucose Fasting GTT       Glucose Tolerance Test 1 hour       Glucose Tolerance Test 3 hour       Gonorrhea (discrete)  Negative  23    Chlamydia (discrete)  Negative  23    RPR  Non-Reactive  23    VDRL       Syphilis Antibody       HBsAg  Non-Reactive  23    Herpes Simplex Virus PCR       Herpes Simplex VIrus Culture  Negative  18 1657    HIV  Non-Reactive  23    Hep C RNA Quant PCR ^ NONREACTIVE  20 1518    Hep C Antibody  Non-Reactive  2314    AFP       Group B Strep  Negative  18    GBS Susceptibility to Clindamycin       GBS Susceptibility to Erythromycin       Fetal Fibronectin        Genetic Testing, Maternal Blood                 Drug Screening       Test Value Date Time    Urine Drug Screen       Amphetamine Screen  Negative  03/06/23 0914    Barbiturate Screen  Negative  03/06/23 0914    Benzodiazepine Screen  Negative  03/06/23 0914    Methadone Screen  Negative  03/06/23 0914    Phencyclidine Screen  Negative  03/06/23 0914    Opiates Screen  Negative  03/06/23 0914    THC Screen  Negative  03/06/23 0914    Cocaine Screen       Propoxyphene Screen  Negative  03/06/23 0914    Buprenorphine Screen  Negative  03/06/23 0914    Methamphetamine Screen       Oxycodone Screen  Negative  03/06/23 0914    Tricyclic Antidepressants Screen  Negative  03/06/23 0914              Legend    ^: Historical                          PRENATAL RISK FACTORS  3/23 Problems (from 03/06/23 to present)       Problem Noted Resolved    Supervision of high risk pregnancy in third trimester 8/24/2023 by Chelsea Walsh MD No    Gestational diabetes mellitus (GDM) in third trimester controlled on oral hypoglycemic drug 8/24/2023 by Chelsea Walsh MD No    Overview Signed 8/24/2023  8:18 AM by Chelsea Walsh MD     Based on BS log         Grand multiparity 5/11/2023 by Chelsea Walsh MD No    Unwanted fertility 5/11/2023 by Chelsea Walsh MD No    Overview Signed 8/10/2023  4:09 PM by Melody Bal APRN     8/10- tubal papers signed.         Rubella non-immune status, antepartum 5/11/2023 by Chelsea Walsh MD No    Overview Signed 5/11/2023  8:55 AM by Chelsea Walsh MD     Needs MMR PP         History of gestational diabetes in prior pregnancy, currently pregnant in third trimester 3/16/2023 by Melody Bal, APRN No    Overview Signed 5/11/2023  8:03 AM by Chelsea Walsh MD     Early glucola WNL         Obesity affecting pregnancy in third trimester 3/16/2023 by Melody Bal, APRN No    Herpes  simplex virus type 2 (HSV-2) infection affecting pregnancy in third trimester 2020 by Teresita Mireles, RN No    Overview Addendum 3/16/2023  4:45 PM by Melody Bal APRN     Will need suppression at 36wks, does not want mentioned in front of anyone               OB HISTORY  OB History    Para Term  AB Living   8 6 6   1 6   SAB IAB Ectopic Molar Multiple Live Births   1         6      # Outcome Date GA Lbr Jai/2nd Weight Sex Delivery Anes PTL Lv   8 Current            7 Term 21 39w0d  4030 g (8 lb 14.2 oz) M Vag-Spont EPI N KAYLEEN      Complications: Gestational diabetes   6 Term 18 38w5d 15:31 / 01:11 4150 g (9 lb 2.4 oz) M Vag-Spont EPI N KAYLEEN      Complications: Failure to Progress in First Stage   5 SAB 2017 6w0d          4 Term 03/27/15 39w0d  3856 g (8 lb 8 oz) M Vag-Spont EPI  KAYLEEN      Birth Comments: IOL    3 Term 13 37w0d  3685 g (8 lb 2 oz) M Vag-Spont None Y KAYLEEN   2 Term 10/22/10 38w1d  3430 g (7 lb 9 oz) M Vag-Spont None  KAYLEEN      Birth Comments: 6-7 cm on arrival   1 Term 08 39w0d  4564 g (10 lb 1 oz) M Vag-Spont None N KAYLEEN      Birth Comments: Stadol and Phenergan used      Complications: Fetal macrosomia     PAST MEDICAL HISTORY  Past Medical History:   Diagnosis Date    Anxiety     Asthma     Gestational diabetes     Herpes     Migraine      PAST SURGICAL HISTORY  Past Surgical History:   Procedure Laterality Date    WISDOM TOOTH EXTRACTION       FAMILY HISTORY  Family History   Problem Relation Age of Onset    No Known Problems Mother     Cirrhosis Father     No Known Problems Brother     No Known Problems Son     No Known Problems Son     No Known Problems Son     No Known Problems Son     No Known Problems Son     No Known Problems Maternal Grandmother     No Known Problems Maternal Grandfather     Alzheimer's disease Paternal Grandmother     Breast cancer Other      SOCIAL HISTORY  Social History     Socioeconomic History    Marital status:     Tobacco Use    Smoking status: Never    Smokeless tobacco: Never   Vaping Use    Vaping Use: Never used   Substance and Sexual Activity    Alcohol use: No    Drug use: No    Sexual activity: Yes     Partners: Male     Comment: LAST PAP NORMAL 7/17/20     ALLERGIES  Allergies   Allergen Reactions    Other Anaphylaxis     APPLES     HOME MEDICATIONS  Prior to Admission medications    Medication Sig Start Date End Date Taking? Authorizing Provider   Alcohol Swabs (Alcohol Pads) 70 % pads 1 pad 4 (Four) Times a Day. 8/10/23  Yes Melody Bal APRN   cetirizine (zyrTEC) 10 MG tablet Take 1 tablet by mouth Daily. 4/13/23  Yes Melody Bal APRN   docusate sodium (Colace) 100 MG capsule Take 1 capsule by mouth 2 (Two) Times a Day. 7/21/23  Yes Chelsea Walsh MD   ferrous sulfate 325 (65 FE) MG tablet Take 1 tablet by mouth 2 (Two) Times a Day. 7/20/23  Yes Chelsea Walsh MD   glucose blood test strip Test blood sugars 4 times per day as directed. 8/10/23  Yes Melody Bal APRN   glucose monitor monitoring kit 1 each As Needed (gestational diabetes testing). May substitute with preferred, available equivalent. 8/10/23  Yes Melody Bal APRN   Lancets (onetouch ultrasoft) lancets Test blood sugars 4 times per day as directed. 8/10/23  Yes Melody Bal APRN   ondansetron (Zofran) 8 MG tablet Take 1 tablet by mouth Every 8 (Eight) Hours As Needed for Nausea or Vomiting. 3/16/23  Yes Melody Bal APRN   valACYclovir (Valtrex) 1000 MG tablet Take 1 tablet by mouth Daily. 3/16/23 8/24/23 Yes Melody Bal APRN   metFORMIN (Glucophage) 500 MG tablet Take 2 tablets by mouth every night at bedtime. 8/24/23 8/23/24  Chelsea Walsh MD   valACYclovir (Valtrex) 500 MG tablet Take 1 tablet by mouth 2 (Two) Times a Day. 8/24/23   Chelsea Walsh MD     PHYSICAL EXAM  /76   Wt 106 kg (233 lb 3.2 oz)   LMP  (LMP Unknown)   BMI 40.03 kg/m²   General: No  acute distress. Well developed, well nourished. Pleasant.  Heart: Regular rate and rhythm. No murmurs, rubs, or gallops  Lungs: Clear to auscultation bilaterally. No wheezes, rales, or rhonchi.  Abdomen: Soft, nontender to palpation, enlarged by gravid uterus.    IMPRESSION  Carlyn Aguila is a 32 y.o.  scheduled for IOL at 38w0d secondary to GDMA2.    PLAN  1.  IOL  - Admit: Labor and Delivery  - Attending: Dr. Walsh  - Condition: Stable  - Vitals: per protocol  - Activity: ad sarwat  - Nursing: Continuous electronic fetal monitoring, as per protocol  - Diet: Clears  - IV fluids:  mL/hr  - Meds: SL Cytotec  - Allergies: Other  - Labs: CBC, T&S, UDS  - GBS: unk.  Antibiotics: unk  - Carlyn Aguila and I have discussed pain goals for this hospitalization after reviewing her current clinical condition, medical history and prior pain experiences.  The goal is to keep her pain level appropriate.  Patient may have epidural if desired.  - Anticipate     This document has been electronically signed by Chelsea Walsh MD on 2023 08:45 CDT.    Electronically signed by Chelsea Walsh MD at 23 0847          Physical Therapy Notes (most recent note)        Eliecer Scales, PT at 23 1059  Version 1 of 1         Patient Name: Carlyn Aguila  : 1991    MRN: [id number]                              Today's Date: 2023       Admit Date: 2023    Visit Dx:     ICD-10-CM ICD-9-CM   1. Gestational diabetes mellitus (GDM) in third trimester controlled on oral hypoglycemic drug  O24.415 648.80     Patient Active Problem List   Diagnosis    Anxiety about health    Herpes simplex virus type 2 (HSV-2) infection affecting pregnancy in third trimester    History of gestational diabetes in prior pregnancy, currently pregnant in third trimester    Obesity affecting pregnancy in third trimester    Grand multiparity    Unwanted fertility    Rubella non-immune  status, antepartum    Supervision of high risk pregnancy in third trimester    Gestational diabetes mellitus (GDM) in third trimester controlled on oral hypoglycemic drug    Single liveborn infant delivered vaginally     Past Medical History:   Diagnosis Date    Anxiety     Asthma     Gestational diabetes     Herpes     Migraine      Past Surgical History:   Procedure Laterality Date    WISDOM TOOTH EXTRACTION        General Information       Row Name 09/23/23 0928          Physical Therapy Time and Intention    Document Type evaluation  -BS     Mode of Treatment individual therapy;physical therapy  -BS       Row Name 09/23/23 0928          General Information    Patient Profile Reviewed yes  -BS     Prior Level of Function independent:;all household mobility;community mobility;transfer;ADL's;cleaning;cooking;home management;driving;shopping  -BS     Existing Precautions/Restrictions other (see comments)  just gave birth to her daugther 2 days ago. In post partum unit  -BS     Barriers to Rehab none identified  -BS       Row Name 09/23/23 0928          Living Environment    People in Home spouse;other (see comments);child(watson), dependent  7 children  -BS     Name(s) of People in Home Eladio Silvaw,   -BS       Row Name 09/23/23 0928          Home Main Entrance    Number of Stairs, Main Entrance three  -BS     Stair Railings, Main Entrance none  -BS       Row Name 09/23/23 0928          Stairs Within Home, Primary    Number of Stairs, Within Home, Primary none  -BS       Row Name 09/23/23 0928          Cognition    Orientation Status (Cognition) oriented x 4  -BS       Row Name 09/23/23 0928          Safety Issues, Functional Mobility    Impairments Affecting Function (Mobility) pain;range of motion (ROM)  -BS               User Key  (r) = Recorded By, (t) = Taken By, (c) = Cosigned By      Initials Name Provider Type    Eliecer Correa PT Physical Therapist                   Mobility       Row Name 09/23/23  0928          Bed Mobility    Bed Mobility supine-sit;sit-supine  -BS     Supine-Sit [place] (Bed Mobility) minimum assist (75% patient effort)  -BS     Sit-Supine [place] (Bed Mobility) modified independence  -BS     Assistive Device (Bed Mobility) bed rails;head of bed elevated  -BS       Row Name 09/23/23 0928          Bed-Chair Transfer    Bed-Chair [place] (Transfers) not tested  -BS       Row Name 09/23/23 0928          Sit-Stand Transfer    Sit-Stand [place] (Transfers) not tested  -[facility] Name 09/23/23 0928          Gait/Stairs (Locomotion)    [place]rah Level (Gait) not tested  -BS     [place] Level (Stairs) not tested  -BS               User Key  (r) = Recorded By, (t) = Taken By, (c) = Cosigned By      Initials Name Provider Type    BS Eliecer Scales PT Physical Therapist                   Obj/Interventions       Row Name 09/23/23 0928          Range of Motion Comprehensive    General Range of Motion neck/trunk range of motion deficits identified  -BS     Comment, General Range of Motion cervical spine AROM: severe 25% ROM loss with flex/extension/lateral flex B/rotation B-all planes of motion  -       Row Name 09/23/23 0928          Strength Comprehensive (MMT)    General Manual Muscle Testing (MMT) Assessment upper extremity strength deficits identified  -BS     Comment, General Manual Muscle Testing (MMT) Assessment 5/5 B myotomes C5-T1 except 4/5 B C5 (shoulder flexion)  -BS       Row Name 09/23/23 0928          Balance    Balance Assessment sitting static balance;sitting dynamic balance  -BS     Static Sitting Balance independent  -BS     Dynamic Sitting Balance independent  -BS     Position, Sitting Balance unsupported  -BS       Row Name 09/23/23 0928          Sensory Assessment (Somatosensory)    Sensory Assessment (Somatosensory) left UE;right UE  -BS     Left UE Sensory Assessment light touch awareness;intact  -BS     Right UE Sensory Assessment light touch  awareness;intact  -BS       Row Name 09/23/23 0928          General Neck/Trunk Range of Motion    Comment, ROM: Neck/Trunk cervical spine AROM: severe limit with all planes of motion; improved cervical rotation B AROM post assessment  -BS       Row Name 09/23/23 0928          Upper Extremity (Manual Muscle Testing)    Comment, MMT: Upper Extremity UE neurodynamic testing: negative median nerve bias B UE's with ULTT 2  -BS               User Key  (r) = Recorded By, (t) = Taken By, (c) = Cosigned By      Initials Name Provider Type    Eliecer Correa, PT Physical Therapist                   Goals/Plan    No documentation.                  Clinical Impression       Row Name 09/23/23 0941 09/23/23 0928       Pain    Pretreatment Pain Rating 10/10  -BS 10/10  -BS    Posttreatment Pain Rating -- 1/10  -BS    Pain Location - Side/Orientation other (see comments)  central  -BS --    Pain Location - neck  -BS --    Pre/Posttreatment Pain Comment -- supported with HOB elevated; took heating pad off her neck region, informed nursing to add an ice pack to her neck to treat her acute inflammation/pain.  -BS    Pain Intervention(s) -- Repositioned;Heat applied;Cold applied  -BS      Row Name 09/23/23 0928          Plan of Care Review    Plan of Care Reviewed With patient;mother  -BS     Outcome Evaluation PT evaluation completed. Patient alert and oriented x 4. Presents with 10/10 neck pain and intermittent frontal and occipital headaches. Ministerio with supine to sit tasks, modified independent sit to supine. Deferred OOB transfer assessment due to severe neck pain noted sitting unsupported at the EOB. Severe AROM loss with all planes of motion with the cervical spine. Upper limb tension testing bilaterally negative for adverse neural tissue tension along B median nerves (B UE's). 5/5 B myotomal strength C5-T1 except 4/5 bilateral C 5 (shoulder flexion) due to neck pain with resistance. Educated patient and pt's mother on  appropriateness for ice with acute inflammation with suspected cervical paraspinal strain (upper trapezius bilaterally). Had more ROM into cervical rotation bilaterally post tx with HEP issued of repeated cervical flex AROM + self overpressure in the mendoza's position. PT evaluation only. Recommend patient d/c home with family to assist as needed. Recommend outpatient PT to follow up for a suspected acute cervical paraspinal strain. Patient had 1/10 neck pain post assessment after returned to the mendoza's position with her neck supported.  -BS       Row Name 09/23/23 0928          Therapy Assessment/Plan (PT)    Patient/Family Therapy Goals Statement (PT) go home with her baby, hold her baby  -BS     Criteria for Skilled Interventions Met (PT) yes;meets criteria;other (see comments)  PT eval only  -BS     Therapy Frequency (PT) evaluation only  -BS       Row Name 09/23/23 0941 09/23/23 0928       Vital Signs    Pre Systolic BP Rehab 139  -BS --    Pre Treatment Diastolic BP 89  -BS --    Intra Systolic BP Rehab -- 131  -BS    Intra Treatment Diastolic BP -- 82  -BS    Pretreatment Heart Rate (beats/min) 106  -BS --    Intratreatment Heart Rate (beats/min) -- 98  -BS    Pre SpO2 (%) 97  -BS --    O2 Delivery Pre Treatment room air  -BS --    Intra SpO2 (%) -- 100  -BS    O2 Delivery Intra Treatment -- room air  -BS    Pre Patient Position Supine  -BS Supine  -BS    Intra Patient Position -- Sitting  -BS    Post Patient Position -- Supine  -BS      Row Name 09/23/23 0941 09/23/23 0928       Positioning and Restraints    Pre-Treatment Position in bed  -BS in bed  -BS    Post Treatment Position -- bed  -BS    In Bed -- notified nsg;fowlers;with family/caregiver  -BS              User Key  (r) = Recorded By, (t) = Taken By, (c) = Cosigned By      Initials Name Provider Type    Eliecer Correa, PT Physical Therapist                   Outcome Measures       Row Name 09/23/23 0928          How much help from another  person do you currently need...    Turning from your back to your side while in flat bed without using bedrails? 4  -BS     Moving from lying on back to sitting on the side of a flat bed without bedrails? 4  -BS     Moving to and from a bed to a chair (including a wheelchair)? 3  -BS     Standing up from a chair using your arms (e.g., wheelchair, bedside chair)? 3  -BS     Climbing 3-5 steps with a railing? 3  -BS     To walk in hospital room? 3  -BS     AM-PAC 6 Clicks Score (PT) 20  -BS     Highest level of mobility 6 --> Walked 10 steps or more  -BS       Row Name 09/23/23 2278          Functional Assessment    Outcome Measure Options AM-PAC 6 Clicks Basic Mobility (PT)  -BS               User Key  (r) = Recorded By, (t) = Taken By, (c) = Cosigned By      Initials Name Provider Type    Eliecer Correa, PT Physical Therapist                    PT Recommendation and Plan     Plan of Care Reviewed With: patient, mother  Outcome Evaluation: PT evaluation completed. Patient alert and oriented x 4. Presents with 10/10 neck pain and intermittent frontal and occipital headaches. Ministerio with supine to sit tasks, modified independent sit to supine. Deferred OOB transfer assessment due to severe neck pain noted sitting unsupported at the EOB. Severe AROM loss with all planes of motion with the cervical spine. Upper limb tension testing bilaterally negative for adverse neural tissue tension along B median nerves (B UE's). 5/5 B myotomal strength C5-T1 except 4/5 bilateral C 5 (shoulder flexion) due to neck pain with resistance. Educated patient and pt's mother on appropriateness for ice with acute inflammation with suspected cervical paraspinal strain (upper trapezius bilaterally). Had more ROM into cervical rotation bilaterally post tx with HEP issued of repeated cervical flex AROM + self overpressure in the mendoza's position. PT evaluation only. Recommend patient d/c home with family to assist as needed. Recommend  outpatient PT to follow up for a suspected acute cervical paraspinal strain. Patient had 1/10 neck pain post assessment after returned to the mendoza's position with her neck supported.     Time Calculation:   PT Evaluation Complexity  History, PT Evaluation Complexity: 1-2 personal factors and/or comorbidities  Examination of Body Systems (PT Eval Complexity): 1-2 elements  Clinical Presentation (PT Evaluation Complexity): stable  Clinical Decision Making (PT Evaluation Complexity): low complexity  Overall Complexity (PT Evaluation Complexity): low complexity     PT Charges       Row Name 23 1058             Time Calculation    Start Time 0928  -BS      Stop Time 1021  -BS      Time Calculation (min) 53 min  -BS      PT Received On 23  -BS      PT Goal Re-Cert Due Date 23  -BS         Time Calculation- PT    Total Timed Code Minutes- PT 53 minute(s)  -BS                User Key  (r) = Recorded By, (t) = Taken By, (c) = Cosigned By      Initials Name Provider Type    BS Eliecer Scales, PT Physical Therapist                  Therapy Charges for Today       Code Description Service Date Service Provider Modifiers Qty    [id number] HC PT EVAL LOW COMPLEXITY 4 2023 Eliecer Scales, PT GP 1            PT G-Codes  Outcome Measure Options: AM-PAC 6 Clicks Basic Mobility (PT)  AM-PAC 6 Clicks Score (PT): 20    PT Discharge Summary  Anticipated Discharge Disposition (PT): home, home with assist    Eliecer Scales PT  2023         Electronically signed by Eliecer Scales, PT at [address] [place] [id number]  Phone:  [phone]  Fax:  [phone] Date: Sep 23, 2023      Ambulatory Referral to Physical Therapy     Patient:  Carlyn Aguila MRN:  [id number]   [facility] [address] :  1991  SSN:    Phone: [phone] Sex:  F      INSURANCE PA[employer] # SUBSCRIBER  ID   Primary:    [facility] [id number]   [id number]      Referring Provider Information:  CRUZ CUETO Phone: [phone] Fax: [phone]       Referral Information:   # Visits:  1 Referral Type: Physical Therapy [AE1]   Urgency:  Routine Referral Reason: Specialty Services Required   Start Date: Sep 23, 2023 End Date:  To be determined by Insurer   Diagnosis: Cervical radiculopathy (M54.12 [ICD-10-CM] 723.4 [ICD-9-CM])      Refer to Dept:   Refer to Provider:   Refer to Provider Phone:   Refer to Facility:       Follow-up needed: Yes     This document serves as a request of services and does not constitute Insurance authorization or approval of services.  To determine eligibility, please contact the members Insurance carrier to verify and review coverage.     If you have medical questions regarding this request for services. Please contact [facility] at [phone] during normal business hours.        Authorizing Provider:Cruz Cueto MD  Authorizing Provider's NPI: [id number]  Order Entered By: Cruz Cueto MD 9/23/2023 12:05 PM     Electronically signed by: Cruz Cuteo MD 9/23/2023 12:05 PM      PAST SURGICAL HISTORY:  No significant past surgical history

## 2024-08-24 NOTE — PROCEDURE
[FreeTextEntry1] : Patient apprised to risks and benefits of steroid injection including possible passage of celestone into breast milk and she offered her verbal consent\par Bilateral Upper Extremities sterilely prepped\par 2cc of 1:1 6mg/mL celestone and 0.5% marcaine administered to each the R and L first dorsal compartments\par Sterile bandaids placed\par Procedure tolerated without complication\par Post-procedure exam with relief of symptoms
Patient/Caregiver provided printed discharge information.

## 2024-09-14 NOTE — OB PROVIDER TRIAGE NOTE - PRETERM DELIVERIES, OB PROFILE
The patient is Watcher - Medium risk of patient condition declining or worsening    Shift Goals  Clinical Goals: vss, monitor bp, safety, rest  Patient Goals: d/c soon  Family Goals: danielle- none at bedside    Progress made toward(s) clinical / shift goals:    Problem: Knowledge Deficit - Standard  Goal: Patient and family/care givers will demonstrate understanding of plan of care, disease process/condition, diagnostic tests and medications  Outcome: Progressing     Problem: Fall Risk  Goal: Patient will remain free from falls  Outcome: Progressing     Problem: Hemodynamics  Goal: Patient's hemodynamics, fluid balance and neurologic status will be stable or improve  Outcome: Progressing     Problem: Skin Integrity  Goal: Skin integrity is maintained or improved  Outcome: Progressing       Patient is not progressing towards the following goals:       0

## 2025-05-08 ENCOUNTER — TRANSCRIPTION ENCOUNTER (OUTPATIENT)
Age: 40
End: 2025-05-08

## 2025-08-11 ENCOUNTER — APPOINTMENT (OUTPATIENT)
Dept: OBGYN | Facility: CLINIC | Age: 40
End: 2025-08-11
Payer: COMMERCIAL

## 2025-08-11 ENCOUNTER — ASOB RESULT (OUTPATIENT)
Age: 40
End: 2025-08-11

## 2025-08-11 ENCOUNTER — NON-APPOINTMENT (OUTPATIENT)
Age: 40
End: 2025-08-11

## 2025-08-11 VITALS
BODY MASS INDEX: 22.09 KG/M2 | WEIGHT: 117 LBS | HEART RATE: 73 BPM | SYSTOLIC BLOOD PRESSURE: 113 MMHG | DIASTOLIC BLOOD PRESSURE: 70 MMHG | HEIGHT: 61 IN

## 2025-08-11 DIAGNOSIS — O36.80X0 PREGNANCY WITH INCONCLUSIVE FETAL VIABILITY, NOT APPLICABLE OR UNSPECIFIED: ICD-10-CM

## 2025-08-11 DIAGNOSIS — N92.6 IRREGULAR MENSTRUATION, UNSPECIFIED: ICD-10-CM

## 2025-08-11 PROCEDURE — 99459 PELVIC EXAMINATION: CPT | Mod: NC

## 2025-08-11 PROCEDURE — 96127 BRIEF EMOTIONAL/BEHAV ASSMT: CPT

## 2025-08-11 PROCEDURE — 99204 OFFICE O/P NEW MOD 45 MIN: CPT

## 2025-08-11 PROCEDURE — 76830 TRANSVAGINAL US NON-OB: CPT

## 2025-08-11 RX ORDER — PNV NO.95/FERROUS FUM/FOLIC AC 28MG-0.8MG
TABLET ORAL
Refills: 0 | Status: ACTIVE | COMMUNITY

## 2025-08-11 RX ORDER — OMEGA-3/DHA/EPA/FISH OIL 1000 MG
CAPSULE ORAL
Refills: 0 | Status: ACTIVE | COMMUNITY

## 2025-08-12 ENCOUNTER — APPOINTMENT (OUTPATIENT)
Dept: OBGYN | Facility: CLINIC | Age: 40
End: 2025-08-12

## 2025-08-12 LAB
ABORH: NORMAL
ANTIBODY SCREEN: NORMAL
HCT VFR BLD CALC: 37.3 %
HGB BLD-MCNC: 12.2 G/DL
MCHC RBC-ENTMCNC: 31.8 PG
MCHC RBC-ENTMCNC: 32.7 G/DL
MCV RBC AUTO: 97.1 FL
PLATELET # BLD AUTO: 316 K/UL
RBC # BLD: 3.84 M/UL
RBC # FLD: 13.5 %
WBC # FLD AUTO: 6.47 K/UL

## 2025-08-13 LAB
BACTERIA UR CULT: NORMAL
C TRACH RRNA SPEC QL NAA+PROBE: NOT DETECTED
HCG SERPL-MCNC: 877 MIU/ML
HPV HIGH+LOW RISK DNA PNL CVX: NOT DETECTED
N GONORRHOEA RRNA SPEC QL NAA+PROBE: NOT DETECTED
SOURCE TP AMPLIFICATION: NORMAL
T VAGINALIS RRNA SPEC QL NAA+PROBE: NOT DETECTED

## 2025-08-15 PROBLEM — O36.80X0 PREGNANCY OF UNKNOWN ANATOMIC LOCATION: Status: ACTIVE | Noted: 2025-08-15

## 2025-08-15 LAB — CYTOLOGY CVX/VAG DOC THIN PREP: NORMAL

## 2025-09-02 ENCOUNTER — APPOINTMENT (OUTPATIENT)
Dept: OBGYN | Facility: CLINIC | Age: 40
End: 2025-09-02
Payer: COMMERCIAL

## 2025-09-02 ENCOUNTER — ASOB RESULT (OUTPATIENT)
Age: 40
End: 2025-09-02

## 2025-09-02 VITALS
BODY MASS INDEX: 22.09 KG/M2 | HEART RATE: 87 BPM | WEIGHT: 117 LBS | SYSTOLIC BLOOD PRESSURE: 121 MMHG | DIASTOLIC BLOOD PRESSURE: 79 MMHG | HEIGHT: 61 IN

## 2025-09-02 DIAGNOSIS — N92.6 IRREGULAR MENSTRUATION, UNSPECIFIED: ICD-10-CM

## 2025-09-02 DIAGNOSIS — O21.9 VOMITING OF PREGNANCY, UNSPECIFIED: ICD-10-CM

## 2025-09-02 PROCEDURE — 90656 IIV3 VACC NO PRSV 0.5 ML IM: CPT

## 2025-09-02 PROCEDURE — 99215 OFFICE O/P EST HI 40 MIN: CPT | Mod: 25

## 2025-09-02 PROCEDURE — 90471 IMMUNIZATION ADMIN: CPT

## 2025-09-02 PROCEDURE — 76817 TRANSVAGINAL US OBSTETRIC: CPT

## 2025-09-02 RX ORDER — ASPIRIN 81 MG/1
81 TABLET, CHEWABLE ORAL
Qty: 60 | Refills: 3 | Status: ACTIVE | COMMUNITY
Start: 2025-09-02 | End: 1900-01-01

## 2025-09-02 RX ORDER — ONDANSETRON 4 MG/1
4 TABLET, ORALLY DISINTEGRATING ORAL
Qty: 30 | Refills: 2 | Status: ACTIVE | COMMUNITY
Start: 2025-09-02 | End: 1900-01-01

## 2025-09-02 RX ORDER — DOXYLAMINE SUCCINATE AND PYRIDOXINE HYDROCHLORIDE 10; 10 MG/1; MG/1
10-10 TABLET, DELAYED RELEASE ORAL
Qty: 60 | Refills: 2 | Status: ACTIVE | COMMUNITY
Start: 2025-09-02 | End: 1900-01-01

## 2025-09-03 LAB
ALBUMIN SERPL ELPH-MCNC: 4.1 G/DL
ALP BLD-CCNC: 42 U/L
ALT SERPL-CCNC: 20 U/L
ANION GAP SERPL CALC-SCNC: 12 MMOL/L
AST SERPL-CCNC: 18 U/L
B19V IGG SER QL IA: 0.13 INDEX
B19V IGG+IGM SER-IMP: NEGATIVE
B19V IGG+IGM SER-IMP: NORMAL
B19V IGM FLD-ACNC: 0.99 INDEX
B19V IGM SER-ACNC: ABNORMAL
BILIRUB SERPL-MCNC: 0.3 MG/DL
BUN SERPL-MCNC: 9 MG/DL
CALCIUM SERPL-MCNC: 9.1 MG/DL
CHLORIDE SERPL-SCNC: 103 MMOL/L
CO2 SERPL-SCNC: 22 MMOL/L
CREAT SERPL-MCNC: 0.47 MG/DL
EGFRCR SERPLBLD CKD-EPI 2021: 123 ML/MIN/1.73M2
ESTIMATED AVERAGE GLUCOSE: 100 MG/DL
GLUCOSE SERPL-MCNC: 85 MG/DL
HBA1C MFR BLD HPLC: 5.1 %
HBV SURFACE AG SER QL: NONREACTIVE
HCV AB SER QL: NONREACTIVE
HCV S/CO RATIO: 0.09 S/CO
HGB A MFR BLD: 96.2 %
HGB A2 MFR BLD: 3 %
HGB F MFR BLD: 0.8 %
HGB FRACT BLD-IMP: NORMAL
HIV1+2 AB SPEC QL IA.RAPID: NONREACTIVE
LEAD BLD-MCNC: <1 UG/DL
MEV IGG FLD QL IA: 24.2 AU/ML
MEV IGG+IGM SER-IMP: POSITIVE
POTASSIUM SERPL-SCNC: 4.3 MMOL/L
PROT SERPL-MCNC: 6.5 G/DL
RUBV IGG FLD-ACNC: 1.08 INDEX
RUBV IGG SER-IMP: POSITIVE
SODIUM SERPL-SCNC: 137 MMOL/L
T GONDII AB SER-IMP: NEGATIVE
T GONDII AB SER-IMP: NEGATIVE
T GONDII IGG SER QL: <3 IU/ML
T GONDII IGM SER QL: <3 AU/ML
T PALLIDUM AB SER QL IA: NEGATIVE
TSH SERPL-ACNC: 0.76 UIU/ML
VZV AB TITR SER: POSITIVE
VZV IGG SER IF-ACNC: 2.04 S/CO

## 2025-09-05 LAB
FMR1 GENE MUT ANL BLD/T: NORMAL
M TB IFN-G BLD-IMP: NEGATIVE
QUANTIFERON TB PLUS MITOGEN MINUS NIL: 5 IU/ML
QUANTIFERON TB PLUS NIL: 0.02 IU/ML
QUANTIFERON TB PLUS TB1 MINUS NIL: 0 IU/ML
QUANTIFERON TB PLUS TB2 MINUS NIL: 0 IU/ML

## 2025-09-10 LAB — CFTR MUT TESTED BLD/T: NEGATIVE

## 2025-09-11 LAB — AR GENE MUT ANL BLD/T: NORMAL
